# Patient Record
Sex: FEMALE | Race: BLACK OR AFRICAN AMERICAN | Employment: UNEMPLOYED | URBAN - METROPOLITAN AREA
[De-identification: names, ages, dates, MRNs, and addresses within clinical notes are randomized per-mention and may not be internally consistent; named-entity substitution may affect disease eponyms.]

---

## 2019-03-01 ENCOUNTER — OFFICE VISIT (OUTPATIENT)
Dept: ORTHOPEDIC SURGERY | Age: 34
End: 2019-03-01

## 2019-03-01 VITALS
SYSTOLIC BLOOD PRESSURE: 115 MMHG | DIASTOLIC BLOOD PRESSURE: 75 MMHG | TEMPERATURE: 97.5 F | WEIGHT: 274.4 LBS | OXYGEN SATURATION: 98 % | HEIGHT: 69 IN | RESPIRATION RATE: 18 BRPM | HEART RATE: 78 BPM | BODY MASS INDEX: 40.64 KG/M2

## 2019-03-01 DIAGNOSIS — M51.36 DDD (DEGENERATIVE DISC DISEASE), LUMBAR: ICD-10-CM

## 2019-03-01 DIAGNOSIS — M54.16 LUMBAR NEURITIS: ICD-10-CM

## 2019-03-01 DIAGNOSIS — M47.817 LUMBOSACRAL SPONDYLOSIS WITHOUT MYELOPATHY: Primary | ICD-10-CM

## 2019-03-01 PROBLEM — E66.01 OBESITY, MORBID (HCC): Status: ACTIVE | Noted: 2019-03-01

## 2019-03-01 RX ORDER — IBUPROFEN 800 MG/1
TABLET ORAL
Refills: 0 | COMMUNITY
Start: 2019-01-17

## 2019-03-01 RX ORDER — QUETIAPINE FUMARATE 100 MG/1
TABLET, FILM COATED ORAL
Refills: 0 | COMMUNITY
Start: 2019-02-25

## 2019-03-01 RX ORDER — TRAMADOL HYDROCHLORIDE 50 MG/1
TABLET ORAL
Refills: 0 | COMMUNITY
Start: 2019-01-23

## 2019-03-01 RX ORDER — GABAPENTIN 100 MG/1
100 CAPSULE ORAL 3 TIMES DAILY
Qty: 90 CAP | Refills: 1 | Status: SHIPPED | OUTPATIENT
Start: 2019-03-01

## 2019-03-01 RX ORDER — PREDNISONE 20 MG/1
TABLET ORAL
Refills: 0 | COMMUNITY
Start: 2019-01-25

## 2019-03-01 RX ORDER — ONDANSETRON 4 MG/1
TABLET, ORALLY DISINTEGRATING ORAL
Refills: 0 | COMMUNITY
Start: 2019-01-17

## 2019-03-01 RX ORDER — LIDOCAINE 50 MG/G
PATCH TOPICAL
Refills: 0 | COMMUNITY
Start: 2019-01-23

## 2019-03-01 RX ORDER — DOCUSATE SODIUM 100 MG/1
CAPSULE, LIQUID FILLED ORAL
Refills: 0 | COMMUNITY
Start: 2019-01-23

## 2019-03-01 RX ORDER — METHOCARBAMOL 500 MG/1
TABLET, FILM COATED ORAL
Refills: 0 | COMMUNITY
Start: 2019-01-31

## 2019-03-01 RX ORDER — SERTRALINE HYDROCHLORIDE 25 MG/1
TABLET, FILM COATED ORAL
Refills: 0 | COMMUNITY
Start: 2019-02-25

## 2019-03-01 RX ORDER — POLYETHYLENE GLYCOL 3350 17 G/17G
POWDER, FOR SOLUTION ORAL
Refills: 0 | COMMUNITY
Start: 2019-01-23

## 2019-03-01 NOTE — PROGRESS NOTES
1. Have you been to the ER, urgent care clinic since your last visit? Hospitalized since your last visit? Yes When: 1/16/19, 1/23/19, 1/25/19 and 1/31/19, [facility] for low back 2. Have you seen or consulted any other health care providers outside of the [address] [place] [address] since your last visit? Include any pap smears or colon screening. Yes, Multiple ER visit

## 2019-03-01 NOTE — PROGRESS NOTES
[facility] 
[address], [address] Phone: [phone] Fax: [phone] INITIAL CONSULTATION 
 
 
HISTORY OF PRESENT ILLNESS: 
Macario Butler is a 28 y.o. female whom is self-referred secondary to chronic, episodic low back pain extending into the RLE in an L5 distribution to the foot involving the great toe. She rates her pain 9/10. Her pain is not exacerbated with any particular position or motion. Pt has had spinal injections in the past x 1 year ago which helped. She denies h/o spinal surgery. She had PT about a year ago and is completing her HEP daily. Pt has treated with MDP with temporary relief and muscle relaxers with no relief. She denies possibility of pregnancy or breastfeeding. Pt denies change in bowel or bladder habits. Pt denies fever, weight loss, or skin changes. Pt has a h/o heroin use, clean for 7 months. She is Rx methadone by [facility]. Pt is followed by a psychiatrist for schizophrenia. ER note from Dr. Jeremi Wilkins dated 1/16/19 indicating patient presented for chronic low back pain without sciatica x 1 week. Worse with lying down and sitting up. H/o heroin use. H/o scoliosis. Having nausea at that time. Clean for 6 months. H/o cocaine use, clean. Treated with Ibuprofen and Tramadol. ER note from Dr. Dennise Jane dated 1/29/19 indicating patient was seen with c/o acute exacerbation chronic low back pain. Worse with standing. Indicated she was on methadone. Pt has asking for a steroid epidural at that time. Treated with Tramadol. ER note from Dr. Pino Raymundo dated 1/25/19 indicating patient presented for chronic back pain x 5 years. Right buttocks with tingling in the left foot. Pt reported no relief with Tramadol. Started on MDP at that time. ER note from Dr. Dennise Jane dated 1/31/19 indicating patient presented for chronic back pain, worse over the past several weeks.  Pt reported initial relief with MDP. Treated with Diclofenac and Robaxin. The patient is RHD.  reviewed. Body mass index is 40.52 kg/m². PCP: No primary care provider on file. History reviewed. No pertinent past medical history. History reviewed. No pertinent surgical history. Social History Tobacco Use  Smoking status: Current Every Day Smoker  Smokeless tobacco: Never Used Substance Use Topics  Alcohol use: No  
  Frequency: Never Work status: The patient is N/A. Marital status: The patient is . No Known Allergies Family History Family history unknown: Yes REVIEW OF SYSTEMS Constitutional symptoms: Negative Eyes: Negative Ears, Nose, Throat, and Mouth: Negative Cardiovascular: Negative Respiratory: Negative Genitourinary: Negative Integumentary (Skin and/or breast): Negative Musculoskeletal: Positive for low back pain, RLE pain Extremities: Negative for edema. Endocrine/Rheumatologic: Negative Hematologic/Lymphatic: Negative Allergic/Immunologic: Negative Psychiatric: Negative PHYSICAL EXAMINATION Visit Vitals /75 Pulse 78 Temp 97.5 °F (36.4 °C) (Oral) Resp 18 Ht 5' 9\" (1.753 m) Wt 274 lb 6.4 oz (124.5 kg) SpO2 98% BMI 40.52 kg/m² CONSTITUTIONAL: NAD, A&O x 3 HEART: Regular rate and rhythm ABDOMEN: Positive bowel sounds, soft, nontender, and nondistended LUNGS: Clear to auscultation bilaterally. SKIN: Negative for rash. RANGE OF MOTION: The patient has full passive range of motion in all four extremities. SENSATION: Sensation is intact to light touch throughout. MOTOR:  
Straight Leg Raise: Negative, bilateral 
[place]: Negative, bilateral 
Deep tendon reflexes are 0 at the brachioradialis, biceps, and triceps bilaterally. Deep tendon reflexes are 0 at the knees and ankles bilaterally. Shoulder AB/Flex Elbow Flex Wrist Ext Elbow Ext Wrist Flex Hand Intrin Tone Right +4/5 +4/5 +4/5 +4/5 +4/5 +4/5 +4/5 Left +4/5 +4/5 +4/5 +4/5 +4/5 +4/5 +4/5 Hip Flex Knee Ext Knee Flex Ankle DF GTE Ankle PF Tone Right +4/5 +4/5 +4/5 +4/5 +4/5 +4/5 +4/5 Left +4/5 +4/5 +4/5 +4/5 +4/5 +4/5 +4/5 ASSESSMENT Diagnoses and all orders for this visit: 1. Lumbosacral spondylosis without myelopathy 2. Lumbar neuritis 3. DDD (degenerative disc disease), lumbar IMPRESSIONS/RECOMMENDATIONS: 
Patient presents today with c/o chronic, episodic low back pain extending into the RLE in an L5 distribution to the foot involving the great toe. Patient has a h/o heroin abuse and I did explain to her that I would not be prescribing her narcotics. I will try her on Neurontin 100 mg TID. The risks, benefits, and potential side effects of this medication were discussed. Patient understands and wishes to proceed. Patient advised to call the office if intolerant to new medication. I will order CBC/ESR/CRP labs for the patient. I advised patient to bring copies of films to next visit. I have asked the patient to sign a release of information in order to obtain block notes from [facility]. Multiple treatment options were discussed. Patient is neurologically intact. I will see the patient back in 1 month's time or earlier if needed. Written by Patricia Greene, as dictated by Silke Fritz MD 
I examined the patient, reviewed and agree with the note.

## 2019-03-29 ENCOUNTER — OFFICE VISIT (OUTPATIENT)
Dept: ORTHOPEDIC SURGERY | Age: 34
End: 2019-03-29

## 2019-03-29 VITALS
HEIGHT: 69 IN | WEIGHT: 270 LBS | SYSTOLIC BLOOD PRESSURE: 118 MMHG | HEART RATE: 97 BPM | TEMPERATURE: 98.8 F | OXYGEN SATURATION: 100 % | RESPIRATION RATE: 16 BRPM | DIASTOLIC BLOOD PRESSURE: 77 MMHG | BODY MASS INDEX: 39.99 KG/M2

## 2019-03-29 DIAGNOSIS — M47.817 LUMBOSACRAL SPONDYLOSIS WITHOUT MYELOPATHY: Primary | ICD-10-CM

## 2019-03-29 DIAGNOSIS — M51.36 DDD (DEGENERATIVE DISC DISEASE), LUMBAR: ICD-10-CM

## 2019-03-29 DIAGNOSIS — M54.16 LUMBAR RADICULOPATHY: ICD-10-CM

## 2019-03-29 RX ORDER — GABAPENTIN 300 MG/1
300 CAPSULE ORAL 3 TIMES DAILY
Qty: 90 CAP | Refills: 1 | Status: SHIPPED | OUTPATIENT
Start: 2019-03-29

## 2019-03-29 RX ORDER — GABAPENTIN 600 MG/1
600 TABLET ORAL 3 TIMES DAILY
Qty: 90 TAB | Refills: 1 | Status: SHIPPED | OUTPATIENT
Start: 2019-03-29 | End: 2019-03-29 | Stop reason: CLARIF

## 2019-03-29 NOTE — PROGRESS NOTES
[facility]  [address], [address]   Phone: [phone]  Fax: [phone]        PROGRESS NOTE      HISTORY OF PRESENT ILLNESS:  The patient is a 28 y.o. female and was seen today for follow up of chronic, episodic low back pain extending into the RLE in an L5 distribution to the foot involving the great toe. Her pain is not exacerbated with any particular position or motion. Pt has had spinal injections in the past x 1 year ago which helped. She denies h/o spinal surgery. She had PT about a year ago and is completing her HEP daily. Pt has treated with MDP with temporary relief and muscle relaxers with no relief. She denies possibility of pregnancy or breastfeeding. Pt denies change in bowel or bladder habits. Pt denies fever, weight loss, or skin changes. The patient is RHD. Pt has a h/o heroin use, clean for 7 months. She was Rx meth[facility]one by [facility]. Pt is followed by psychiatrist Dr. Damon Rubio [phone]) for schizophrenia. ER note from Dr. Milan Rubin dated 1/16/19 indicating patient presented for chronic low back pain without sciatica x 1 week. Worse with lying down and sitting up. H/o heroin use. H/o scoliosis. Having nausea at that time. Clean for 6 months. H/o cocaine use, clean. Treated with Ibuprofen and Tramadol. ER note from Dr. Zena Servin dated 1/29/19 indicating patient was seen with c/o acute exacerbation chronic low back pain. Worse with standing. Indicated she was on methadone. Pt has asking for a steroid epidural at that time. Treated with Tramadol. ER note from Dr. Ricci Romano dated 1/25/19 indicating patient presented for chronic back pain x 5 years. Right buttocks with tingling in the left foot. Pt reported no relief with Tramadol. Started on MDP at that time. ER note from Dr. Zena Servin dated 1/31/19 indicating patient presented for chronic back pain, worse over the past several weeks. Pt reported initial relief with MDP. Treated with Diclofenac and Robaxin. At her last clinical appointment, patient presented with c/o chronic, episodic low back pain extending into the RLE in an L5 distribution to the foot involving the great toe. Patient has a h/o heroin abuse and I did explain to her that I would not be prescribing her narcotics. I tried her on Neurontin 100 mg TID. I ordered CBC/ESR/CRP labs for the patient. I advised patient to bring copies of films to next visit. I asked the patient to sign a release of information in order to obtain block notes from [facility]. The patient returns today with pain location and distribution remain unchanged. She continues to rate her pain 9/10. Pt did not get the CBC/ESR/CRP labs that were ordered at her last clinical appointment for reasons that are unclear to me. She is tolerating Neurontin 100 mg TID with no relief. According to the pt, she is no longer being prescribed methadone. Records from [facility] were not obtained. Lumbar spine XR dated 1/23/19 reviewed. Per report, no significant abnormality.  reviewed. Body mass index is 39.87 kg/m². PCP: No primary care provider on file. History reviewed. No pertinent past medical history.      Social History     Socioeconomic History    Marital status:      Spouse name: Not on file    Number of children: Not on file    Years of education: Not on file    Highest education level: Not on file   Occupational History    Not on file   Social Needs    Financial resource strain: Not on file    Food insecurity:     Worry: Not on file     Inability: Not on file    Transportation needs:     Medical: Not on file     Non-medical: Not on file   Tobacco Use    Smoking status: Current Every Day Smoker    Smokeless tobacco: Never Used   Substance and Sexual Activity    Alcohol use: No     Frequency: Never    Drug use: No    Sexual activity: Not on file   Lifestyle    Physical activity:     Days per week: Not on file     Minutes per session: Not on file    Stress: Not on file   Relationships    Social connections:     Talks on phone: Not on file     Gets together: Not on file     Attends Oriental orthodox service: Not on file     Active member of club or organization: Not on file     Attends meetings of clubs or organizations: Not on file     Relationship status: Not on file    Intimate partner violence:     Fear of current or ex partner: Not on file     Emotionally abused: Not on file     Physically abused: Not on file     Forced sexual activity: Not on file   Other Topics Concern     Service Not Asked    Blood Transfusions Not Asked    Caffeine Concern Not Asked    Occupational Exposure Not Asked    Hobby Hazards Not Asked    Sleep Concern Not Asked    Stress Concern Not Asked    Weight Concern Not Asked    Special Diet Not Asked    Back Care Not Asked    Exercise Not Asked    Bike Helmet Not Asked   [address] Not Asked    Self-Exams Not Asked   Social History Narrative    Not on file       Current Outpatient Medications   Medication Sig Dispense Refill    sertraline (ZOLOFT) 25 mg tablet take 1 tablet by mouth every morning  0    QUEtiapine (SEROQUEL) 100 mg tablet take 1 and 1/2 tablet by mouth at bedtime  0    traMADol (ULTRAM) 50 mg tablet take 1 tablet by mouth every 6 hours if needed for pain for UP TO 1 DAY  0    predniSONE (DELTASONE) 20 mg tablet take 3 tablets by mouth once daily for 5 days  0    polyethylene glycol (MIRALAX) 17 gram/dose powder take 17GM (DISSOLVED IN 8 OUNCES OF LIQUID) by mouth once daily  0    ondansetron (ZOFRAN ODT) 4 mg disintegrating tablet dissolve 1 tablet ON TONGUE every 8 hours if needed for nausea and vomiting  0    methocarbamol (ROBAXIN) 500 mg tablet take 1 tablet by mouth four times a day  0    lidocaine (LIDODERM) 5 % apply 1 patch to the affected area daily. Leave on for 12 hours and then off for 12 hours.   0    ibuprofen (MOTRIN) 800 mg tablet take 1 tablet by mouth every 6 hours if needed  0    COL-RITE 100 mg capsule take 1 capsule by mouth twice a day for 14 days  0    gabapentin (NEURONTIN) 100 mg capsule Take 1 Cap by mouth three (3) times daily. 90 Cap 1       No Known Allergies       PHYSICAL EXAMINATION    Visit Vitals  /77 (BP 1 Location: Left arm, BP Patient Position: Sitting)   Pulse 97   Temp 98.8 °F (37.1 °C) (Oral)   Resp 16   Ht 5' 9\" (1.753 m)   Wt 270 lb (122.5 kg)   SpO2 100%   BMI 39.87 kg/m²       CONSTITUTIONAL: NAD, A&O x 3  SENSATION: Intact to light touch throughout  RANGE OF MOTION: The patient has full passive range of motion in all four extremities. MOTOR:  Straight Leg Raise: Negative, bilateral               Hip Flex Knee Ext Knee Flex Ankle DF GTE Ankle PF Tone   Right +4/5 +4/5 +4/5 +4/5 +4/5 +4/5 +4/5   Left +4/5 +4/5 +4/5 +4/5 +4/5 +4/5 +4/5       ASSESSMENT   Diagnoses and all orders for this visit:    1. Lumbosacral spondylosis without myelopathy    2. Lumbar radiculopathy    3. DDD (degenerative disc disease), lumbar          IMPRESSION AND PLAN:  I will increase her dose of Neurontin to 300 mg TID. Patient advised to call the office if intolerant to higher dose. I will reorder CBC/ESR/CRP labs for the patient. I will once again attempt to obtain patient's records from [facility]. Patient is neurologically intact. I will see the patient back in 1 month's time or earlier if needed. Written by Siri Lo, as dictated by Diamante Burt MD  I examined the patient, reviewed and agree with the note.

## 2019-04-01 DIAGNOSIS — M47.817 LUMBOSACRAL SPONDYLOSIS WITHOUT MYELOPATHY: ICD-10-CM

## 2019-04-01 DIAGNOSIS — M51.36 DDD (DEGENERATIVE DISC DISEASE), LUMBAR: ICD-10-CM

## 2019-04-01 DIAGNOSIS — M54.16 LUMBAR NEURITIS: ICD-10-CM

## 2020-01-14 ENCOUNTER — OFFICE VISIT (OUTPATIENT)
Dept: OBGYN CLINIC | Age: 35
End: 2020-01-14

## 2020-01-14 ENCOUNTER — HOSPITAL ENCOUNTER (OUTPATIENT)
Dept: LAB | Age: 35
Discharge: HOME | End: 2020-01-14
Payer: MEDICAID

## 2020-01-14 DIAGNOSIS — F45.8 PSEUDOCYESIS: ICD-10-CM

## 2020-01-14 DIAGNOSIS — N92.6 MISSED MENSES: ICD-10-CM

## 2020-01-14 DIAGNOSIS — F45.8 PSEUDOCYESIS: Primary | ICD-10-CM

## 2020-01-14 LAB — HCG SERPL-ACNC: <1 MIU/ML (ref 0–10)

## 2020-01-14 PROCEDURE — 84702 CHORIONIC GONADOTROPIN TEST: CPT

## 2020-01-14 NOTE — PROGRESS NOTES
Previous chart closed in error    Ms. Pedro Luis Koch presents reports an LMP in September. She reports being a patient at the methadone clinic and states that she informed them about abdominal pain, spotting, and nausea. She states that she had a +pregnancy test there. Today, her UPT is negative. A bedside U/S shows a normal appearing uterus with an endometrial stripe of 6mm. Increased concern for pseudocyesis. Will obtain beta HCG for further confirmation. Orders Placed This Encounter    AMB POC US OB < 14 WKS, 1ST GESTATION     Order Specific Question:   Reason for Exam     Answer:   missed menses     Order Specific Question:   Is Patient Allergic to Contrast Dye? Answer:   No     Order Specific Question:   Is Patient Pregnant?      Answer:   Unknown    BETA HCG, QT     Standing Status:   Future     Standing Expiration Date:   1/14/2021

## 2020-01-17 ENCOUNTER — TELEPHONE (OUTPATIENT)
Dept: OBGYN CLINIC | Age: 35
End: 2020-01-17

## 2020-01-17 NOTE — TELEPHONE ENCOUNTER
----- Message from Tram Mcarthur MD sent at 1/15/2020 10:37 AM EST -----  HCG negative. Please advise. Thank you.

## 2020-01-20 ENCOUNTER — TELEPHONE (OUTPATIENT)
Dept: OBGYN CLINIC | Age: 35
End: 2020-01-20

## 2020-01-20 NOTE — TELEPHONE ENCOUNTER
----- Message from Della Bowling MD sent at 1/15/2020 10:37 AM EST -----  HCG negative. Please advise. Thank you.

## 2020-01-20 NOTE — TELEPHONE ENCOUNTER
I have attempted to contact this patient by phone with the following results: no answer unable to leave message .

## 2020-02-05 ENCOUNTER — HOSPITAL ENCOUNTER (EMERGENCY)
Age: 35
Discharge: HOME | End: 2020-02-05
Attending: EMERGENCY MEDICINE
Payer: MEDICAID

## 2020-02-05 VITALS
SYSTOLIC BLOOD PRESSURE: 115 MMHG | WEIGHT: 288 LBS | OXYGEN SATURATION: 97 % | DIASTOLIC BLOOD PRESSURE: 78 MMHG | BODY MASS INDEX: 42.65 KG/M2 | HEIGHT: 69 IN | RESPIRATION RATE: 20 BRPM | TEMPERATURE: 97 F | HEART RATE: 83 BPM

## 2020-02-05 DIAGNOSIS — M25.562 CHRONIC PAIN OF BOTH KNEES: Primary | ICD-10-CM

## 2020-02-05 DIAGNOSIS — M25.561 CHRONIC PAIN OF BOTH KNEES: Primary | ICD-10-CM

## 2020-02-05 DIAGNOSIS — G89.29 CHRONIC PAIN OF BOTH KNEES: Primary | ICD-10-CM

## 2020-02-05 DIAGNOSIS — J06.9 UPPER RESPIRATORY TRACT INFECTION, UNSPECIFIED TYPE: ICD-10-CM

## 2020-02-05 PROCEDURE — 99282 EMERGENCY DEPT VISIT SF MDM: CPT

## 2020-02-05 RX ORDER — PSEUDOEPHEDRINE HYDROCHLORIDE 60 MG/1
60 TABLET ORAL
Qty: 20 TAB | Refills: 1 | Status: SHIPPED | OUTPATIENT
Start: 2020-02-05 | End: 2020-02-10

## 2020-02-05 RX ORDER — FLUTICASONE PROPIONATE 50 MCG
2 SPRAY, SUSPENSION (ML) NASAL DAILY
Qty: 1 BOTTLE | Refills: 0 | Status: SHIPPED | OUTPATIENT
Start: 2020-02-05

## 2020-02-05 NOTE — DISCHARGE INSTRUCTIONS
Patient Education        Knee Pain or Injury: Care Instructions  Your Care Instructions    Injuries are a common cause of knee problems. Sudden (acute) injuries may be caused by a direct blow to the knee. They can also be caused by abnormal twisting, bending, or falling on the knee. Pain, bruising, or swelling may be severe, and may start within minutes of the injury. Overuse is another cause of knee pain. Other causes are climbing stairs, kneeling, and other activities that use the knee. Everyday wear and tear, especially as you get older, also can cause knee pain. Rest, along with home treatment, often relieves pain and allows your knee to heal. If you have a serious knee injury, you may need tests and treatment. Follow-up care is a key part of your treatment and safety. Be sure to make and go to all appointments, and call your doctor if you are having problems. It's also a good idea to know your test results and keep a list of the medicines you take. How can you care for yourself at home? · Be safe with medicines. Read and follow all instructions on the label. ? If the doctor gave you a prescription medicine for pain, take it as prescribed. ? If you are not taking a prescription pain medicine, ask your doctor if you can take an over-the-counter medicine. · Rest and protect your knee. Take a break from any activity that may cause pain. · Put ice or a cold pack on your knee for 10 to 20 minutes at a time. Put a thin cloth between the ice and your skin. · Prop up a sore knee on a pillow when you ice it or anytime you sit or lie down for the next 3 days. Try to keep it above the level of your heart. This will help reduce swelling. · If your knee is not swollen, you can put moist heat, a heating pad, or a warm cloth on your knee. · If your doctor recommends an elastic bandage, sleeve, or other type of support for your knee, wear it as directed.   · Follow your doctor's instructions about how much weight you can put on your leg. Use a cane, crutches, or a walker as instructed. · Follow your doctor's instructions about activity during your healing process. If you can do mild exercise, slowly increase your activity. · Reach and stay at a healthy weight. Extra weight can strain the joints, especially the knees and hips, and make the pain worse. Losing even a few pounds may help. When should you call for help? Call 911 anytime you think you may need emergency care. For example, call if:    · You have symptoms of a blood clot in your lung (called a pulmonary embolism). These may include:  ? Sudden chest pain. ? Trouble breathing. ? Coughing up blood.    Call your doctor now or seek immediate medical care if:    · You have severe or increasing pain.     · Your leg or foot turns cold or changes color.     · You cannot stand or put weight on your knee.     · Your knee looks twisted or bent out of shape.     · You cannot move your knee.     · You have signs of infection, such as:  ? Increased pain, swelling, warmth, or redness. ? Red streaks leading from the knee. ? Pus draining from a place on your knee. ? A fever.     · You have signs of a blood clot in your leg (called a deep vein thrombosis), such as:  ? Pain in your calf, back of the knee, thigh, or groin. ? Redness and swelling in your leg or groin.    Watch closely for changes in your health, and be sure to contact your doctor if:    · You have tingling, weakness, or numbness in your knee.     · You have any new symptoms, such as swelling.     · You have bruises from a knee injury that last longer than 2 weeks.     · You do not get better as expected. Where can you learn more? Go to http://karma-vik.info/. Enter K195 in the search box to learn more about \"Knee Pain or Injury: Care Instructions. \"  Current as of: June 26, 2019  Content Version: 12.2  © 1128-4893 [employer].  Care instructions adapted under license by [facility] (which disclaims liability or warranty for this information). If you have questions about a medical condition or this instruction, always ask your healthcare professional. [place] [address] any warranty or liability for your use of this information. Patient Education        Upper Respiratory Infection (Cold): Care Instructions  Your Care Instructions    An upper respiratory infection, or URI, is an infection of the nose, sinuses, or throat. URIs are spread by coughs, sneezes, and direct contact. The common cold is the most frequent kind of URI. The flu and sinus infections are other kinds of URIs. Almost all URIs are caused by viruses. Antibiotics won't cure them. But you can treat most infections with home care. This may include drinking lots of fluids and taking over-the-counter pain medicine. You will probably feel better in 4 to 10 days. The doctor has checked you carefully, but problems can develop later. If you notice any problems or new symptoms, get medical treatment right away. Follow-up care is a key part of your treatment and safety. Be sure to make and go to all appointments, and call your doctor if you are having problems. It's also a good idea to know your test results and keep a list of the medicines you take. How can you care for yourself at home? · To prevent dehydration, drink plenty of fluids, enough so that your urine is light yellow or clear like water. Choose water and other caffeine-free clear liquids until you feel better. If you have kidney, heart, or liver disease and have to limit fluids, talk with your doctor before you increase the amount of fluids you drink. · Take an over-the-counter pain medicine, such as acetaminophen (Tylenol), ibuprofen (Advil, Motrin), or naproxen (Aleve). Read and follow all instructions on the label. · Before you use cough and cold medicines, check the label.  These medicines may not be safe for young children or for people with certain health problems. · Be careful when taking over-the-counter cold or flu medicines and Tylenol at the same time. Many of these medicines have acetaminophen, which is Tylenol. Read the labels to make sure that you are not taking more than the recommended dose. Too much acetaminophen (Tylenol) can be harmful. · Get plenty of rest.  · Do not smoke or allow others to smoke around you. If you need help quitting, talk to your doctor about stop-smoking programs and medicines. These can increase your chances of quitting for good. When should you call for help? Call 911 anytime you think you may need emergency care. For example, call if:    · You have severe trouble breathing.    Call your doctor now or seek immediate medical care if:    · You seem to be getting much sicker.     · You have new or worse trouble breathing.     · You have a new or higher fever.     · You have a new rash.    Watch closely for changes in your health, and be sure to contact your doctor if:    · You have a new symptom, such as a sore throat, an earache, or sinus pain.     · You cough more deeply or more often, especially if you notice more mucus or a change in the color of your mucus.     · You do not get better as expected. Where can you learn more? Go to http://karma-vik.info/. Enter G802 in the search box to learn more about \"Upper Respiratory Infection (Cold): Care Instructions. \"  Current as of: June 9, 2019  Content Version: 12.2  © 3316-4643 1.618 [employer]. Care instructions adapted under license by [employer] (which disclaims liability or warranty for this information). If you have questions about a medical condition or this instruction, always ask your healthcare professional. [place] [address] any warranty or liability for your use of this information.

## 2020-02-05 NOTE — ED PROVIDER NOTES
[employer]  [facility] EMERGENCY DEPT      Date: 2/5/2020  Patient Name: Sergio Mendoza    History of Presenting Illness     Chief Complaint   Patient presents with    Nasal Congestion    Back Pain     lower  back    Knee Pain     R knee pain       27 y.o. female presents to the ED c/o nasal congestion for the past 2 days. She notes also having chronic aching in her bilateral knees for the past month. Patient states she has a diagnosis of arthritis and notes that she has fluid in her knees. She does not recall any injury. She has not taken anything for her current symptoms. Denies any numbness, weakness, redness, warmth, fever, chills, or other symptoms at this time. Patient denies any other associated signs or symptoms. Patient denies any other complaints. Nursing notes regarding the HPI and triage nursing notes were reviewed. Prior medical records were reviewed. Current Outpatient Medications   Medication Sig Dispense Refill    fluticasone propionate (FLONASE) 50 mcg/actuation nasal spray 2 Sprays by Both Nostrils route daily. 1 Bottle 0    sodium chloride (NASAL MIST) 0.9 % spra 1 Spray by Nasal route as needed (congestion). 126 mL 0    pseudoephedrine (SUDAFED) 60 mg tablet Take 1 Tab by mouth every six (6) hours as needed for Congestion for up to 5 days. 20 Tab 1    gabapentin (NEURONTIN) 300 mg capsule Take 1 Cap by mouth three (3) times daily.  90 Cap 1    sertraline (ZOLOFT) 25 mg tablet take 1 tablet by mouth every morning  0    QUEtiapine (SEROQUEL) 100 mg tablet take 1 and 1/2 tablet by mouth at bedtime  0    traMADol (ULTRAM) 50 mg tablet take 1 tablet by mouth every 6 hours if needed for pain for UP TO 1 DAY  0    predniSONE (DELTASONE) 20 mg tablet take 3 tablets by mouth once daily for 5 days  0    polyethylene glycol (MIRALAX) 17 gram/dose powder take 17GM (DISSOLVED IN 8 OUNCES OF LIQUID) by mouth once daily  0    ondansetron (ZOFRAN ODT) 4 mg disintegrating tablet dissolve 1 tablet ON TONGUE every 8 hours if needed for nausea and vomiting  0    methocarbamol (ROBAXIN) 500 mg tablet take 1 tablet by mouth four times a day  0    lidocaine (LIDODERM) 5 % apply 1 patch to the affected area daily. Leave on for 12 hours and then off for 12 hours. 0    ibuprofen (MOTRIN) 800 mg tablet take 1 tablet by mouth every 6 hours if needed  0    COL-RITE 100 mg capsule take 1 capsule by mouth twice a day for 14 days  0    gabapentin (NEURONTIN) 100 mg capsule Take 1 Cap by mouth three (3) times daily. 80 Cap 1       Past History     Past Medical History:  History reviewed. No pertinent past medical history. Past Surgical History:  History reviewed. No pertinent surgical history. Family History:  Family History   Problem Relation Age of Onset    No Known Problems Mother     No Known Problems Father        Social History:  Social History     Tobacco Use    Smoking status: Current Every Day Smoker    Smokeless tobacco: Current User   Substance Use Topics    Alcohol use: No     Frequency: Never    Drug use: No       Allergies:  No Known Allergies    Patient's primary care provider (as noted in EPIC):  None    Review of Systems   Constitutional:  Denies malaise, fever, chills. ENMT:  + nasal congestion. Denies sore throat. Neck:  Denies injury or pain. Respiratory:  Denies cough, wheezing, difficulty breathing, shortness of breath. Extremity/MS: + bilateral knee pain. Neuro:  Denies neurologic symptoms/deficits/paresthesias. Skin: Denies injury, rash, itching or skin changes. All other systems negative as reviewed. Visit Vitals  /78 (BP 1 Location: Left arm, BP Patient Position: At rest;Sitting)   Pulse 83   Temp 97 °F (36.1 °C)   Resp 20   Ht 5' 9\" (1.753 m)   Wt 130.6 kg (288 lb)   SpO2 97%   BMI 42.53 kg/m²       PHYSICAL EXAM:    CONSTITUTIONAL:  Alert, in no apparent distress; morbidly obese. HEAD:  Normocephalic, atraumatic. EYES:  EOMI. Non-icteric sclera. Normal conjunctiva. ENTM:  Nose: Nasal turbinates erythematous and edematous, mild clear rhinorrhea. Throat:  no erythema or exudate, mucous membranes moist.  Uvula midline, airway patent. NECK:  Supple  RESPIRATORY:  Chest clear, equal breath sounds, good air movement. Without wheezes, rhonchi or rales. CARDIOVASCULAR:  Regular rate and rhythm. No murmurs, rubs, or gallops. UPPER EXT:  Normal inspection. LOWER EXT: Bilateral knees with mild edema present, without warmth or erythema, no tenderness to palpation, MVI distally with 5-5 strength. NEURO:  Moves all four extremities, and grossly normal motor exam.  SKIN:  No rashes;  Normal for age. PSYCH:  Alert and normal affect. DIFFERENTIAL DIAGNOSES/ MEDICAL DECISION MAKING:  URI, allergic rhinitis, arthritis, DJD, other etiologies. IMPRESSION AND MEDICAL DECISION MAKING:  Based upon the patient's presentation with noted HPI and PE, along with the work up done in the emergency department, I believe that the patient is having an upper respiratory infection, yet no signs of bronchitis nor pneumonia. Will write for Flonase, nasal mist, Sudafed. Patient is questioning if she needs antibiotics, I have informed her this is likely viral and should go away on its own with time, rest, drinking lots of water. Patient also having chronic knee pain bilaterally x1 month. She states she has a history of arthritis and thinks she has fluid on her knees. She did not have any injury, will not x-ray. She is morbidly obese, may have degenerative joint disease and thus chronic knee pain. Plan for follow-up with orthopedics. She was counseled on taking ibuprofen/Tylenol at home for her pain and RICEing.     Diagnosis:   1. Chronic pain of both knees    2.  Upper respiratory tract infection, unspecified type      Disposition: Discharge    Follow-up Information     Follow up With Specialties Details [facility] [address] [facility] - [place] In 3 days  [address] [facility], [address] [facility] [address]    Vicente Velasquez MD Internal Medicine In 3 days  [address] [place] [address]  [address] [address]  [phone]      [facility] EMERGENCY DEPT Emergency Medicine  If symptoms worsen [address] [id number]  [phone]          Discharge Medication List as of 2/5/2020 11:17 AM      START taking these medications    Details   fluticasone propionate (FLONASE) 50 mcg/actuation nasal spray 2 Sprays by Both Nostrils route daily. , Print, Disp-1 Bottle, R-0      sodium chloride (NASAL MIST) 0.9 % spra 1 Spray by Nasal route as needed (congestion). , Print, Disp-126 mL, R-0      pseudoephedrine (SUDAFED) 60 mg tablet Take 1 Tab by mouth every six (6) hours as needed for Congestion for up to 5 days. , Print, Disp-20 Tab, R-1         CONTINUE these medications which have NOT CHANGED    Details   !! gabapentin (NEURONTIN) 300 mg capsule Take 1 Cap by mouth three (3) times daily. , Normal, Disp-90 Cap, R-1      sertraline (ZOLOFT) 25 mg tablet take 1 tablet by mouth every morning, Historical Med, R-0      QUEtiapine (SEROQUEL) 100 mg tablet take 1 and 1/2 tablet by mouth at bedtime, Historical Med, R-0      traMADol (ULTRAM) 50 mg tablet take 1 tablet by mouth every 6 hours if needed for pain for UP TO 1 DAY, Historical Med, R-0      predniSONE (DELTASONE) 20 mg tablet take 3 tablets by mouth once daily for 5 days, Historical Med, R-0      polyethylene glycol (MIRALAX) 17 gram/dose powder take 17GM (DISSOLVED IN 8 OUNCES OF LIQUID) by mouth once daily, Historical Med, R-0      ondansetron (ZOFRAN ODT) 4 mg disintegrating tablet dissolve 1 tablet ON TONGUE every 8 hours if needed for nausea and vomiting, Historical Med, R-0      methocarbamol (ROBAXIN) 500 mg tablet take 1 tablet by mouth four times a day, Historical Med, R-0      lidocaine (LIDODERM) 5 % apply 1 patch to the affected area daily. Leave on for 12 hours and then off for 12 hours. , Historical Med, R-0      ibuprofen (MOTRIN) 800 mg tablet take 1 tablet by mouth every 6 hours if needed, Historical Med, R-0      COL-RITE 100 mg capsule take 1 capsule by mouth twice a day for 14 days, Historical Med, R-0, MARIELA      !! gabapentin (NEURONTIN) 100 mg capsule Take 1 Cap by mouth three (3) times daily. , Normal, Disp-90 Cap, R-1       !! - Potential duplicate medications found. Please discuss with provider.         SHUBHAM Cowan

## 2021-01-20 ENCOUNTER — TRANSCRIBE ORDER (OUTPATIENT)
Dept: SCHEDULING | Age: 36
End: 2021-01-20

## 2021-01-20 DIAGNOSIS — M17.11 OSTEOARTHRITIS OF RIGHT KNEE: Primary | ICD-10-CM

## 2021-05-17 ENCOUNTER — HOSPITAL ENCOUNTER (OUTPATIENT)
Dept: PHYSICAL THERAPY | Age: 36
Discharge: HOME | End: 2021-05-17
Payer: MEDICAID

## 2021-05-17 PROCEDURE — 97162 PT EVAL MOD COMPLEX 30 MIN: CPT | Performed by: PHYSICAL THERAPIST

## 2021-05-17 NOTE — PROGRESS NOTES
PT DAILY TREATMENT NOTE/KNEE EVAL     Patient Name: Anoop Number  Date:2021  : 1985  [x]  Patient  Verified  Payor: [facility] MEDICAID / Plan: [facility] [address] [facility] Product Type: Managed Care Medicaid /    In time:11:59  Out time:12:30  Total Treatment Time (min): 31  Visit #: 1 of 8    Treatment Area: Pain in right knee [M25.561]  Left knee pain [M25.562]    SUBJECTIVE  Pain Level (0-10 scale): 10/10 now; 8/10 at best; 10/10 at worst.  [x]constant []intermittent []improving []worsening []no change since onset    Any medication changes, allergies to medications, adverse drug reactions, diagnosis change, or new procedure performed?: [x] No    [] Yes (see summary sheet for update)  Subjective functional status/changes:     PLOF: Independent, no limitations prior to two months ago. Limitations to PLOF: Limited activity due to knee pain. Unable to do \"outside things\". Mechanism of Injury: Sudden onset of bilateral knee pain two months ago. She denies any injury or trauma. States she has arthritis in her knees. She also c/o back pain. Current symptoms/Complaints: Constant pain in both knees, right > left. She notes she cannot walk much, unable to do outdoor activities. She does not have steps at home so she does not have to do stairs. She states she is unable to kneel or squat. Previous Treatment/Compliance: No PT. Did have injections. Two weeks ago. Helped \"somewhat\". PMHx/Surgical Hx: No h/o surgery on her knees. Work Hx: Not working  Living Situation: single level living. Pt Goals: \"So I can feel better and make the pain better somewhat\". Barriers: [x]pain []financial []time []transportation []other  Cognition: A & O x 3    Other:    OBJECTIVE/EXAMINATION  Domestic Life: Lives with her fiance. Activity/Recreational Limitations: Limited activity. Mobility: ambulates without any AD. Self Care: Needs assistance with housekeeping, cooking.       20 min [x]Silver []Re-Eval       11 min Therapeutic Exercise:  [] See flow sheet : Emphasis on increasing LE AROM and strength. Rationale: increase ROM and increase strength to improve the patients ability to increase tolerance to activity. With   [] TE   [] TA   [] neuro   [] other: Patient Education: [x] Review HEP    [] Progressed/Changed HEP based on:   [] positioning   [] body mechanics   [] transfers   [] heat/ice application    [] other:      Other Objective/Functional Measures:     Physical Therapy Evaluation - Knee    Posture: [] Varus    [x] Valgus    [] Recurvatum        [] Tibial Torsion    [] Foot Supination    [] Foot Pronation    Describe:    Gait:  [x] Normal    [] Abnormal    [] Antalgic    [] NWB    Device: None    Describe: normal gait pattern with decreased meng. Increased lateral excursion noted.       ROM / Strength  [] Unable to assess                  AROM                                        Strength (1-5)    Left Right Left Right Left Right   Hip Flexion [facility] WFL                  Abduction WFL                  Knee Flexion 113 118   4 4    Extension 0 0   4 4                           Flexibility: [] Unable to assess at this time  Hamstrings:    (L) Tightness= [] WNL   [] Min   [x] Mod   [] Severe    (R) Tightness= [] WNL   [] Min   [x] Mod   [] Severe  Quadriceps:    (L) Tightness= [] WNL   [] Min   [x] Mod   [] Severe    (R) Tightness= [] WNL   [] Min   [x] Mod   [] Severe    Palpation:  Left Knee:   Neg/Pos  Neg/Pos  Neg/Pos   Joint Line(Med/Lat) (+) MCL Quad tendon \"a little\" Patellar tendon (+)   Patella (-) Gastrocnemius (Med/Lat) (-) Pes Anserinus (+)   Popliteal Fossa \"a little\" Hamstring tendons (Med/Lat) (+) lateral MCL/LCL MCL (+)     Right Knee   Neg/Pos  Neg/Pos  Neg/Pos   Joint Line(Med/Lat) (+)/(+) Quad tendon (+) Patellar tendon (+)   Patella (-) Gastrocnemius (Med/Lat) (+) Pes Anserinus (+)   Popliteal Fossa (+) Hamstring tendons (Med/Lat) (+) B MCL/LCL (+)/(+) Optional Tests:  Unable to assess due to poor motor activation of quads muscles. Patellar Positioning (Static)   []L []R Normal []L []R Lateral   []L []R Savilla Contes      []L []R Medial   []L []R [facility]    Patellar Tracking   []L []R Glide (Lat)   []L []R Tilt (Lat)     []L []R Glide (Med)  []L []R Tilt (Med)      []L []R Tile (Inf)     Patellar Mobility   []L []R Hypermobile [x]L [x]R Hypomobile         Special Tests. Lachmans  [x] Neg    [] Pos bilaterally    Valgus@ 0 Degrees [x] Neg    [] Pos bilaterally    Valgus@ 30 Degrees [x] Neg    [] Pos bilaterally    Varus@ 0 Degrees [x] Neg    [] Pos bilaterally    Varus@ 30 Degrees [] Neg    [x] Pos laxity bilaterally   Patellar Compression [x] Neg    [] Pos bilaterally      Pain Level (0-10 scale) post treatment: 7.5    ASSESSMENT/Changes in Function: Patient with signs and symptoms consistent with bilateral knee pain. She notes sudden onset about two months ago. Reports she has arthritis and has had injections which gave \"some\" relief. She also notes she is \"capable of falling\" and does report dizziness. She notes she has difficulty with housekeeping, cooking and other chores. She does not have to do steps as she lives in a single level home. She notes she needs assistance with self care ADLs. Patient will continue to benefit from skilled PT services to modify and progress therapeutic interventions, address functional mobility deficits, address strength deficits, analyze and address soft tissue restrictions, analyze and cue movement patterns, address imbalance/dizziness and instruct in home and community integration to attain remaining goals. [x]  See Plan of Care  []  See progress note/recertification  []  See Discharge Summary         Progress towards goals / Updated goals:  Short Term Goals: To be accomplished in 1 weeks:  1.   Patient will become proficient in their HEP and will be compliant in performing that program.  Evaluation:   Patient given a written/illustrated HEP.     Long Term Goals: To be accomplished in 4 weeks:  1. Patient's pain level will be 5-6/10 with activity in order to improve patient's ability to perform normal ADLs. Evaluation:  8/10-10/10  2. Patient will demonstrate 5/5 strength on MMT quads and hamstrings both knees to increase ease of ADLs. Evaluation:  Quads 4/5; Hamstrings 4/5  3. Patient will increase FOTO score to 47 to indicate increased functional mobility. Evaluation:  14  4. Patient will report she has mild to moderate difficulty getting into and out of the bath in order to improve her self care ADLs. Evaluation:  Notes this is extremely difficult or she is unable to perform.     PLAN  [x]  Upgrade activities as tolerated     [x]  Continue plan of care  []  Update interventions per flow sheet       []  Discharge due to:_  []  Other:_      Francia Stevens, PT 5/17/2021  10:10 AM

## 2021-05-17 NOTE — PROGRESS NOTES
[facility]              [address] [facility]        [place], [address] [place]   [phone] fax    Plan of Care/ Statement of Necessity for Physical Therapy Services  Patient name: Vero Carreno Start of Care: 2021   Referral source: Elaine Blankenship MD : 1985    Medical Diagnosis: Pain in right knee [M25.561]  Left knee pain [M25.562]  Payor: [facility] MEDICAID / [id number] / Product Type: Managed Care Medicaid /  Onset Date:3/15/2021    Treatment Diagnosis: Right knee pain; Left knee pain   Prior Hospitalization: see medical history Provider#: [id number]   Medications: Verified on Patient summary List    Comorbidities: Allergies, Anxiety/Panic Attacks, Arthritis, Back Pain, BMI 45.0, Depression, Headaches. Prior Level of Function: Independent, no limitations prior to two months ago. The Plan of Care and following information is based on the information from the initial evaluation. Assessment/ key information: Patient with signs and symptoms consistent with bilateral knee pain. She notes sudden onset about two months ago. Reports she has arthritis and has had injections which gave \"some\" relief. She also notes she is \"capable of falling\" and does report dizziness. She notes she has difficulty with housekeeping, cooking and other chores. She does not have to do steps as she lives in a single level home. She notes she needs assistance with self care ADLs. Patient will benefit from a program of skilled physical therapy to include therapeutic exercises to address strength deficits, therapeutic activities to improve functional mobility, neuromuscular reeducation to address balance, coordination and proprioception, manual therapy to address ROM and tissue extensibility and modalities as indicated. All questions were answered.     Evaluation Complexity History HIGH Complexity :3+ comorbidities / personal factors will impact the outcome/ POC ; Examination MEDIUM Complexity : 3 Standardized tests and measures addressing body structure, function, activity limitation and / or participation in recreation  ;Presentation MEDIUM Complexity : Evolving with changing characteristics  ; Clinical Decision Making HIGH Complexity : FOTO score of 1- 25   Overall Complexity Rating: MEDIUM  Problem List: pain affecting function, decrease strength, decrease ADL/ functional abilitiies, decrease activity tolerance and decrease flexibility/ joint mobility   Treatment Plan may include any combination of the following: Therapeutic exercise, Therapeutic activities, Neuromuscular re-education, Physical agent/modality, Gait/balance training and Manual therapy  Patient / Family readiness to learn indicated by: asking questions, trying to perform skills and interest  Persons(s) to be included in education: patient (P)  Barriers to Learning/Limitations: None  Patient Goal (s): So I can feel better and make the pain better somewhat  Patient Self Reported Health Status: fair  Rehabilitation Potential: fair    Short Term Goals: To be accomplished in 1 weeks:  1. Patient will become proficient in their HEP and will be compliant in performing that program.  Evaluation:   Patient given a written/illustrated HEP. Long Term Goals: To be accomplished in 4 weeks:  1. Patient's pain level will be 5-6/10 with activity in order to improve patient's ability to perform normal ADLs. Evaluation:  8/10-10/10  2. Patient will demonstrate 5/5 strength on MMT quads and hamstrings both knees to increase ease of ADLs. Evaluation:  Quads 4/5; Hamstrings 4/5  3. Patient will increase FOTO score to 47 to indicate increased functional mobility. Evaluation:  14  4. Patient will report she has mild to moderate difficulty getting into and out of the bath in order to improve her self care ADLs.   Evaluation:  Notes this is extremely difficult or she is unable to perform. Frequency / Duration: Patient to be seen 2 times per week for 4 weeks. Patient/ Caregiver education and instruction: Diagnosis, prognosis, exercises   [x]  Plan of care has been reviewed with APOLINAR Bonds, PT 5/17/2021 10:08 AM  ________________________________________________________________________    I certify that the above Therapy Services are being furnished while the patient is under my care. I agree with the treatment plan and certify that this therapy is necessary.     [de-identified] Signature:____________Date:_________TIME:________     Othel Moritz, MD  ** Signature, Date and Time must be completed for valid certification **  Please sign and return to [facility] [address], [address] [place]   [phone] fax

## 2021-05-20 ENCOUNTER — APPOINTMENT (OUTPATIENT)
Dept: PHYSICAL THERAPY | Age: 36
End: 2021-05-20
Payer: MEDICAID

## 2021-05-26 ENCOUNTER — HOSPITAL ENCOUNTER (OUTPATIENT)
Dept: PHYSICAL THERAPY | Age: 36
Discharge: HOME | End: 2021-05-26
Payer: MEDICAID

## 2021-05-26 PROCEDURE — 97530 THERAPEUTIC ACTIVITIES: CPT

## 2021-05-26 PROCEDURE — 97112 NEUROMUSCULAR REEDUCATION: CPT

## 2021-05-26 PROCEDURE — 97110 THERAPEUTIC EXERCISES: CPT

## 2021-05-26 NOTE — PROGRESS NOTES
PT DAILY TREATMENT NOTE     Patient Name: Mary Anne Cuenca  Date:2021  : 1985  [x]  Patient  Verified  Payor: [facility] MEDICAID / Plan: [facility] [address] [facility] / Product Type: Managed Care Medicaid /    In time:7:19  Out time:8;16  Total Treatment Time (min): 62  Visit #: 2 of 8    Treatment Area: Pain in right knee [M25.561]  Left knee pain [M25.562]    SUBJECTIVE  Pain Level (0-10 scale): right knee 10;left knee 8  Any medication changes, allergies to medications, adverse drug reactions, diagnosis change, or new procedure performed?: [x] No    [] Yes (see summary sheet for update)  Subjective functional status/changes:   [] No changes reported  Patient reports she is in constant pain at all times. Patient reports she continues to feel relief from injections in her knees, but reports having pain along the top of her left foot, which she previously had injections for. OBJECTIVE    Modality rationale: decrease pain to improve the patients ability to decrease difficulty while performing tasks. Min Type Additional Details   10 []  Ice     [x]  heat  []  Ice massage  []  Laser   []  Anodyne Position:sitting  Location:B knee   [] Skin assessment post-treatment:  []intact []redness- no adverse reaction    []redness  adverse reaction:       27 min Therapeutic Exercise:  [x] See flow sheet :   Rationale: increase ROM and increase strength to improve the patients ability to increase ease with ADLs. 10 min Therapeutic Activity:  [x]  See flow sheet :empahsis on transfers, bed mobility and ease with ambulation. Rationale: increase ROM and increase strength  to improve the patients ability to increase ease with prolong ambulation. 10 min Neuromuscular Re-education:  [x]  See flow sheet :empahsis on quad activation.     Rationale: increase ROM, increase strength, improve coordination, improve balance and increase proprioception  to improve the patients ability to increase ease with transitional movement. With   [] TE   [] TA   [] neuro   [] other: Patient Education: [x] Review HEP    [] Progressed/Changed HEP based on:   [] positioning   [] body mechanics   [] transfers   [] heat/ice application    [] other:      Other Objective/Functional Measures: Patient reports performing HEP. Pain Level (0-10 scale) post treatment: right knee 6; left knee 4    ASSESSMENT/Changes in Function: Initiated exercises per POC. Patient reported a painful \"poppping\" with LAQ, encouraged patient to not go in full extension where the popping was occurring. Patient has limited mobility with supine bridges. Patient will continue to benefit from skilled PT services to modify and progress therapeutic interventions, address functional mobility deficits, address ROM deficits, address strength deficits and analyze and address soft tissue restrictions to attain remaining goals. []  See Plan of Care  []  See progress note/recertification  []  See Discharge Summary         Progress towards goals / Updated goals:  Short Term Goals: To be accomplished in 1 weeks:  1.  Patient will become proficient in their HEP and will be compliant in performing that program.  Evaluation:   Patient given a written/illustrated HEP. Current: MET: Patient reports performing HEP. 5/26/21     Long Term Goals: To be accomplished in 4 weeks:  1. Patient's pain level will be 5-6/10 with activity in order to improve patient's ability to perform normal ADLs. Evaluation:  8/10-10/10  2. Patient will demonstrate 5/5 strength on MMT quads and hamstrings both knees to increase ease of ADLs. Evaluation:  Quads 4/5; Hamstrings 4/5  3. Patient will increase FOTO score to 47 to indicate increased functional mobility. Evaluation:  14  4. Patient will report she has mild to moderate difficulty getting into and out of the bath in order to improve her self care ADLs.   Evaluation:  Notes this is extremely difficult or she is unable to perform.       PLAN  []  Upgrade activities as tolerated     [x]  Continue plan of care  []  Update interventions per flow sheet       []  Discharge due to:_  []  Other:_      Leidy Morton PTA 5/26/2021  7:24 AM    Future Appointments   Date Time Provider Jason Gambino   5/27/2021  7:15 AM Richard Siddiqui PTA [facility] [id number]

## 2021-05-27 ENCOUNTER — APPOINTMENT (OUTPATIENT)
Dept: PHYSICAL THERAPY | Age: 36
End: 2021-05-27
Payer: MEDICAID

## 2021-05-28 ENCOUNTER — HOSPITAL ENCOUNTER (OUTPATIENT)
Dept: PHYSICAL THERAPY | Age: 36
Discharge: HOME | End: 2021-05-28
Payer: MEDICAID

## 2021-05-28 PROCEDURE — 97110 THERAPEUTIC EXERCISES: CPT

## 2021-05-28 PROCEDURE — 97530 THERAPEUTIC ACTIVITIES: CPT

## 2021-05-28 PROCEDURE — 97112 NEUROMUSCULAR REEDUCATION: CPT

## 2021-05-28 NOTE — PROGRESS NOTES
PT DAILY TREATMENT NOTE     Patient Name: Tressa Turner  Date:2021  : 1985  [x]  Patient  Verified  Payor: Bianca Martin / Plan: [facility] [address] [facility] CCCP / Product Type: Managed Care Medicaid /    In time:7:10  Out time:7:48  Total Treatment Time (min): 38  Visit #: 3 of 8    Treatment Area: Pain in right knee [M25.561]  Left knee pain [M25.562]    SUBJECTIVE  Pain Level (0-10 scale): B knee 8  Any medication changes, allergies to medications, adverse drug reactions, diagnosis change, or new procedure performed?: [x] No    [] Yes (see summary sheet for update)  Subjective functional status/changes:   [] No changes reported  Patient reports she was sore after last therapy session. OBJECTIVE          18 min Therapeutic Exercise:  [x]? See flow sheet :   Rationale: increase ROM and increase strength to improve the patients ability to increase ease with ADLs.    10 min Therapeutic Activity:  [x]? See flow sheet :empahsis on transfers, bed mobility and ease with ambulation. Rationale: increase ROM and increase strength  to improve the patients ability to increase ease with prolong ambulation. 10 min Neuromuscular Re-education:  [x]? See flow sheet :empahsis on quad activation. Rationale: increase ROM, increase strength, improve coordination, improve balance and increase proprioception  to improve the patients ability to increase ease with transitional movement.            With   [] TE   [] TA   [] neuro   [] other: Patient Education: [x] Review HEP    [] Progressed/Changed HEP based on:   [] positioning   [] body mechanics   [] transfers   [] heat/ice application    [] other:      Other Objective/Functional Measures: supine bridge = 50% AROM     Pain Level (0-10 scale) post treatment: 6.5    ASSESSMENT/Changes in Function:Held on initiation of new exercises due to soreness after last session, patient was able to tolerate increase in reps.      Patient will continue to benefit from skilled PT services to modify and progress therapeutic interventions, address functional mobility deficits, address ROM deficits, address strength deficits and analyze and address soft tissue restrictions to attain remaining goals. []  See Plan of Care  []  See progress note/recertification  []  See Discharge Summary         Progress towards goals / Updated goals:  Short Term Goals: To be accomplished in 1 weeks:  1.  Patient will become proficient in their HEP and will be compliant in performing that program.  Evaluation:   Patient given a written/illustrated HEP. Current: MET: Patient reports performing HEP. 5/26/21     Long Term Goals: To be accomplished in 4 weeks:  1. Patient's pain level will be 5-6/10 with activity in order to improve patient's ability to perform normal ADLs. Evaluation:  8/10-10/10  2. Patient will demonstrate 5/5 strength on MMT quads and hamstrings both knees to increase ease of ADLs. Evaluation:  Quads 4/5; Hamstrings 4/5  3. Patient will increase FOTO score to 47 to indicate increased functional mobility. Evaluation:  14  4. Patient will report she has mild to moderate difficulty getting into and out of the bath in order to improve her self care ADLs.   Evaluation:  Notes this is extremely difficult or she is unable to perform.       PLAN  []  Upgrade activities as tolerated     [x]  Continue plan of care  []  Update interventions per flow sheet       []  Discharge due to:_  []  Other:_      Bala Valdivia PTA 5/28/2021  7:16 AM    Future Appointments   Date Time Provider Jason Gambino   6/1/2021  2:45 PM Maggie Hand, PT [facility]   6/4/2021  2:00 PM Maggie Hand PT [facility]   6/7/2021  2:45 PM Sukhdeep Padron, PTA [facility]   6/10/2021  2:00 PM Sukhdeep Padron, PTA [facility]

## 2021-06-01 ENCOUNTER — HOSPITAL ENCOUNTER (OUTPATIENT)
Dept: PHYSICAL THERAPY | Age: 36
Discharge: HOME | End: 2021-06-01
Payer: MEDICAID

## 2021-06-01 PROCEDURE — 97112 NEUROMUSCULAR REEDUCATION: CPT | Performed by: PHYSICAL THERAPIST

## 2021-06-01 PROCEDURE — 97530 THERAPEUTIC ACTIVITIES: CPT | Performed by: PHYSICAL THERAPIST

## 2021-06-01 PROCEDURE — 97110 THERAPEUTIC EXERCISES: CPT | Performed by: PHYSICAL THERAPIST

## 2021-06-01 NOTE — PROGRESS NOTES
PT DAILY TREATMENT NOTE     Patient Name: Lola Sutton  Date:2021  : 1985  [x]  Patient  Verified  Payor: [employer] [place] / Plan: [facility] [address] [facility] CCCP / Product Type: Managed Care Medicaid /    In time:3:04  Out time:3:53  Total Treatment Time (min): 52  Visit #: 4 of 8    Treatment Area: Pain in right knee [M25.561]  Left knee pain [M25.562]    SUBJECTIVE  Pain Level (0-10 scale): 10  Any medication changes, allergies to medications, adverse drug reactions, diagnosis change, or new procedure performed?: [x] No    [] Yes (see summary sheet for update)  Subjective functional status/changes:   [] No changes reported  Patient reports she continues to have bilateral knee pain and pain also in her foot and she has scoliosis so she has pain in \"all 3 of those places\". She notes she has improved and feels the periods that she has no pain is lasting longer. OBJECTIVE    Modality rationale: decrease pain to improve the patients ability to increase her tolerance to activity.    Min Type Additional Details    [] Estim:  []Unatt       []IFC  []Premod                        []Other:  []w/ice   []w/heat  Position:  Location:    [] Estim: []Att    []TENS instruct  []NMES                    []Other:  []w/US   []w/ice   []w/heat  Position:  Location:    []  Traction: [] Cervical       []Lumbar                       [] Prone          []Supine                       []Intermittent   []Continuous Lbs:  [] before manual  [] after manual    []  Ultrasound: []Continuous   [] Pulsed                           []1MHz   []3MHz W/cm2:  Location:    []  Iontophoresis with dexamethasone         Location: [] Take home patch   [] In clinic   10 []  Ice     [x]  heat  []  Ice massage  []  Laser   []  Anodyne Position: supine  Location:both knees    []  Laser with stim  []  Other:  Position:  Location:    []  Vasopneumatic Device  Pre-treatment girth:  Post-treatment girth:  Measured at (location):  Pressure: [] lo [] med [] hi   Temperature: [] lo [] med [] hi   [] Skin assessment post-treatment:  []intact []redness- no adverse reaction    []redness  adverse reaction:     19 min Therapeutic Exercise:  [] See flow sheet :Emphasis on increasing AROM and LE strength. Rationale: increase ROM and increase strength to improve the patients ability to increase her functional activity level. 10 min Therapeutic Activity:  []  See flow sheet :Emphasis on transfers into and out of her tub, and in and out of her car. Rationale: increase strength and improve coordination  to improve the patients ability to increase ease with transfers and mobility. 10 min Neuromuscular Re-education:  []  See flow sheet : Emphasis on quad and gluteal muscle activation in order to increase LE proprioception and kinesthetic awareness. Rationale: increase strength, improve coordination and increase proprioception  to improve the patients ability to increase ease with ambulation and stairs. With   [] TE   [] TA   [] neuro   [] other: Patient Education: [x] Review HEP    [] Progressed/Changed HEP based on:   [] positioning   [] body mechanics   [] transfers   [] heat/ice application    [] other:      Other Objective/Functional Measures: Able to observe patient exit her car, she did not exhibit any difficulty with that action. She ambulates without any assistive device and she did not have any altered gait pattern despite report of 10/10 pain. Pain Level (0-10 scale) post treatment: 8.5    ASSESSMENT/Changes in Function: Patient continues to report high levels of pain however, objectively she did not have any gait abnormalities entering therapy today, nor did she have any problems with transferring out of and into her car.     Patient will continue to benefit from skilled PT services to modify and progress therapeutic interventions, address functional mobility deficits, address ROM deficits, address strength deficits and analyze and address soft tissue restrictions to attain remaining goals. [x]  See Plan of Care  []  See progress note/recertification  []  See Discharge Summary         Progress towards goals / Updated goals:  Short Term Goals: To be accomplished in 1 weeks:  1.  Patient will become proficient in their HEP and will be compliant in performing that program.  Evaluation:   Patient given a written/illustrated HEP. Current: MET: Patient reports performing HEP. 5/26/21     Long Term Goals: To be accomplished in 4 weeks:  1. Patient's pain level will be 5-6/10 with activity in order to improve patient's ability to perform normal ADLs. Evaluation:  8/10-10/10  Current:  6/10-10/10.  6/1/2021. Progressing. 2. Patient will demonstrate 5/5 strength on MMT quads and hamstrings both knees to increase ease of ADLs. Evaluation:  Quads 4/5; Hamstrings 4/5  Current:  Quads 5/5; HS 5/5.  6/1/2021 Goal Met.  3. Patient will increase FOTO score to 47 to indicate increased functional mobility. Evaluation:  14  Current:  FOTO 23.  6/1/2021. Progressing. 4. Patient will report she has mild to moderate difficulty getting into and out of the bath in order to improve her self care ADLs. Evaluation:  Notes this is extremely difficult or she is unable to perform. Current:  Patient reports extreme difficulty. 6/1/2021. No change.     PLAN  [x]  Upgrade activities as tolerated     [x]  Continue plan of care  []  Update interventions per flow sheet       []  Discharge due to:_  []  Other:_      Cass Angelo, PT 6/1/2021  3:06 PM    Future Appointments   Date Time Provider Jason Gambino   6/4/2021  2:00 PM Vangie Diaz, PT [facility]   6/7/2021  2:45 PM Mireille Head, PTA [facility]   6/10/2021  2:00 PM Mireille Push, PTA [facility]

## 2021-06-01 NOTE — PROGRESS NOTES
[facility]              [address]        [place], [address] [place]   [phone] fax    Physician Update  [x] Progress Note  [] Discharge Summary  Patient name: Kristan Berg Start of Care: 2021   Referral source: Maria T Paniagua MD : 1985   Medical/Treatment Diagnosis: Pain in right knee [M25.561]  Left knee pain [M25.562]  Payor: [facility] MEDICAID / Plan: [id number] [place] [address] CCCP / Product Type: Managed Care Medicaid /  Onset Date:3/15/2021     Prior Hospitalization: see medical history Provider#: [id number]   Medications: Verified on Patient Summary List    Comorbidities: Allergies, Anxiety/Panic Attacks, Arthritis, Back Pain, BMI 45.0, Depression, Headaches. Prior Level of Function: Independent, no limitations prior to two months ago. Visits from Start of Care: 4    Missed Visits: 2    Status at Evaluation/Last Progress Note:   Short Term Goals: To be accomplished in 1 weeks:  1.  Patient will become proficient in their HEP and will be compliant in performing that program.  Evaluation:   Patient given a written/illustrated HEP.     Long Term Goals: To be accomplished in 4 weeks:  1. Patient's pain level will be 5-6/10 with activity in order to improve patient's ability to perform normal ADLs. Evaluation:  8/10-10/10  2. Patient will demonstrate 5/5 strength on MMT quads and hamstrings both knees to increase ease of ADLs. Evaluation:  Quads 4/5; Hamstrings 4/5  3. Patient will increase FOTO score to 47 to indicate increased functional mobility. Evaluation:  14  4. Patient will report she has mild to moderate difficulty getting into and out of the bath in order to improve her self care ADLs.   Evaluation:  Notes this is extremely difficult or she is unable to perform.     Progress towards Goals:   Short Term Goals: To be accomplished in 1 weeks:  1.  Patient will become proficient in their HEP and will be compliant in performing that program.  Evaluation:   Patient given a written/illustrated HEP. Current: MET: Patient reports performing HEP. 5/26/21     Long Term Goals: To be accomplished in 4 weeks:  1. Patient's pain level will be 5-6/10 with activity in order to improve patient's ability to perform normal ADLs. Evaluation:  8/10-10/10  Current:  6/10-10/10.  6/1/2021. Progressing. 2. Patient will demonstrate 5/5 strength on MMT quads and hamstrings both knees to increase ease of ADLs. Evaluation:  Quads 4/5; Hamstrings 4/5  Current:  Quads 5/5; HS 5/5.  6/1/2021 Goal Met.  3. Patient will increase FOTO score to 47 to indicate increased functional mobility. Evaluation:  14  Current:  FOTO 23.  6/1/2021. Progressing. 4. Patient will report she has mild to moderate difficulty getting into and out of the bath in order to improve her self care ADLs. Evaluation:  Notes this is extremely difficult or she is unable to perform. Current:  Patient reports extreme difficulty. 6/1/2021. No change.     Goals: to be achieved in 2 weeks:  Continue to work toward unmet goals above. ASSESSMENT/RECOMMENDATIONS:  Patient is making slow progress toward goals. Her subjective c/o pain continue to be severe despite lack of objective findings. Patient observed exiting and entering her car without apparent discomfort or difficulty. Her gait is without deviation and she is walking without any AD despite claim of 10/10 pain level.     [x]Continue therapy per initial plan/protocol at a frequency of  2 x per week for 2 weeks  []Continue therapy with the following recommended changes:_____________________      _____________________________________________________________________  []Discontinue therapy progressing towards or have reached established goals  []Discontinue therapy due to lack of appreciable progress towards goals  []Discontinue therapy due to lack of attendance or compliance  []Await Physician's recommendations/decisions regarding therapy  []Other:________________________________________________________________    Thank you for this referral.    Toñito Pena, PT 6/1/2021 5:27 PM    NOTE TO PHYSICIAN:  PLEASE COMPLETE THE ORDERS BELOW AND   FAX TO [facility]: [phone]  If you are unable to process this request in 24 hours please contact our office: [phone]    []  I have read the above report and request that my patient continue as recommended. []  I have read the above report and request that my patient continue therapy with the following changes/special instructions:________________________________________  []I have read the above report and request that my patient be discharged from therapy.     Physician's Signature:____________Date:_________TIME:________     Rene Zhao MD  ** Signature, Date and Time must be completed for valid certification **

## 2021-06-04 ENCOUNTER — APPOINTMENT (OUTPATIENT)
Dept: PHYSICAL THERAPY | Age: 36
End: 2021-06-04
Payer: MEDICAID

## 2021-06-08 ENCOUNTER — APPOINTMENT (OUTPATIENT)
Dept: PHYSICAL THERAPY | Age: 36
End: 2021-06-08
Payer: MEDICAID

## 2021-06-08 NOTE — PROGRESS NOTES
[facility]              [address]        [place], [address] [place]   [phone] fax    Discharge Summary  Patient name: Seema Marmolejo Start of Care: 2021   Referral source: Lia Sanchez MD : 1985   Medical/Treatment Diagnosis: Pain in right knee [M25.561]  Left knee pain [M25.562]  Payor: [facility] MEDICAID / Plan: [address] [place] [address] CCCP / Product Type: Managed Care Medicaid /  Onset Date:3/15/2021     Prior Hospitalization: see medical history Provider#: [id number]   Medications: Verified on Patient Summary List    Comorbidities: Allergies, Anxiety/Panic Attacks, Arthritis, Back Pain, BMI 45.0, Depression, Headaches. Prior Level of Function: Independent, no limitations prior to two months ago. Visits from Start of Care: 4    Missed Visits: 3  Reporting Period : 2021 to 2021    Summary of Care:  Short Term Goals: To be accomplished in 1 weeks:  1.  Patient will become proficient in their HEP and will be compliant in performing that program.  Evaluation:   Patient given a written/illustrated HEP. Current: MET: Patient reports performing HEP. 21     Long Term Goals: To be accomplished in 4 weeks:  1. Patient's pain level will be 5-6/10 with activity in order to improve patient's ability to perform normal ADLs. Evaluation:  8/10-10/10  Current:  6/10-10/10.  2021.  Progressing. 2. Patient will demonstrate 5/5 strength on MMT quads and hamstrings both knees to increase ease of ADLs. Evaluation:  Quads 4/5; Hamstrings 4/5  Current:  Quads 5/5; HS 5/5.  2021 Goal Met.  3. Patient will increase FOTO score to 47 to indicate increased functional mobility. Evaluation:  14  Current:  FOTO 23.  2021.  Progressing. 4. Patient will report she has mild to moderate difficulty getting into and out of the bath in order to improve her self care ADLs.   Evaluation:  Notes this is extremely difficult or she is unable to perform. Current:  Patient reports extreme difficulty.  6/1/2021.  No change.       ASSESSMENT/RECOMMENDATIONS:  [x]Discontinue therapy: []Patient has reached or is progressing toward set goals      [x]Patient is non-compliant, she has failed to show for 3 of her scheduled visits and is discharged per our established policy. Patient was made aware of this policy prior to her discharge. []Due to lack of appreciable progress towards set goals    Libra Buck, PT 6/8/2021 8:12 AM    NOTE TO PHYSICIAN:  Please complete the following and fax to: [facility] at [phone]  . Retain this original for your records. If you are unable to process this request in   24 hours, please contact our office.      [] I have read the above report and request that my patient continue therapy with the following changes/special instructions:  [] I have read the above report and request that my patient be discharged from therapy    Physician's Signature:____________Date:_________TIME:________     Padilla Blankenship MD  ** Signature, Date and Time must be completed for valid certification **

## 2021-06-10 ENCOUNTER — APPOINTMENT (OUTPATIENT)
Dept: PHYSICAL THERAPY | Age: 36
End: 2021-06-10
Payer: MEDICAID

## 2021-11-08 ENCOUNTER — OFFICE VISIT (OUTPATIENT)
Dept: ORTHOPEDIC SURGERY | Age: 36
End: 2021-11-08
Payer: MEDICAID

## 2021-11-08 VITALS — HEIGHT: 69 IN | OXYGEN SATURATION: 99 % | WEIGHT: 293 LBS | BODY MASS INDEX: 43.4 KG/M2 | HEART RATE: 81 BPM

## 2021-11-08 DIAGNOSIS — M17.0 PRIMARY OSTEOARTHRITIS OF BOTH KNEES: Primary | ICD-10-CM

## 2021-11-08 PROCEDURE — 20611 DRAIN/INJ JOINT/BURSA W/US: CPT | Performed by: ORTHOPAEDIC SURGERY

## 2021-11-08 PROCEDURE — 99203 OFFICE O/P NEW LOW 30 MIN: CPT | Performed by: ORTHOPAEDIC SURGERY

## 2021-11-08 RX ORDER — TRIAMCINOLONE ACETONIDE 40 MG/ML
80 INJECTION, SUSPENSION INTRA-ARTICULAR; INTRAMUSCULAR ONCE
Status: COMPLETED | OUTPATIENT
Start: 2021-11-08 | End: 2021-11-08

## 2021-11-08 RX ADMIN — TRIAMCINOLONE ACETONIDE 80 MG: 40 INJECTION, SUSPENSION INTRA-ARTICULAR; INTRAMUSCULAR at 15:37

## 2021-11-08 NOTE — PROGRESS NOTES
Cristofer Coleman  1985   Chief Complaint   Patient presents with    Knee Pain     both knees R>L         HISTORY OF PRESENT ILLNESS  Cristofer Coleman is a 39 y.o. female who presents today for evaluation of b/l knee pain. She rates her pain 10/10 today. Pain has been present for years. She notes hx of arthritis in her knees and foot. Has had prior cortisone injections most recently around 6 months ago which provided relief for 60-90 days. Patient describes the pain as aching and stabbing that is Constant in nature. Symptoms are worse with bending, stretching, walking, Activity and is better with  Rest. Associated symptoms include nothing. Since problem started, it: is unchanged. Pain does not wake patient up at night. Has taken no meds for the problem. Has tried following treatments: Injections:YES; Brace:NO; Therapy:NO; Cane/Crutch:NO       No Known Allergies     History reviewed. No pertinent past medical history.    Social History     Socioeconomic History    Marital status:      Spouse name: Not on file    Number of children: Not on file    Years of education: Not on file    Highest education level: Not on file   Occupational History    Not on file   Tobacco Use    Smoking status: Current Every Day Smoker    Smokeless tobacco: Current User   Substance and Sexual Activity    Alcohol use: No    Drug use: No    Sexual activity: Yes     Partners: Male   Other Topics Concern     Service Not Asked    Blood Transfusions Not Asked    Caffeine Concern Not Asked    Occupational Exposure Not Asked    Hobby Hazards Not Asked    Sleep Concern Not Asked    Stress Concern Not Asked    Weight Concern Not Asked    Special Diet Not Asked    Back Care Not Asked    Exercise Not Asked    Bike Helmet Not Asked   [address] Not Asked    Self-Exams Not Asked   Social History Narrative    Not on file     Social Determinants of Health     Financial Resource Strain:     Difficulty of Paying Living Expenses: Not on file   Food Insecurity:     Worried About [address] in the Last Year: Not on file    Ran Out of Food in the Last Year: Not on file   Transportation Needs:     Lack of Transportation (Medical): Not on file    Lack of Transportation (Non-Medical): Not on file   Physical Activity:     Days of Exercise per Week: Not on file    Minutes of Exercise per Session: Not on file   Stress:     Feeling of Stress : Not on file   Social Connections:     Frequency of Communication with Friends and Family: Not on file    Frequency of Social Gatherings with Friends and Family: Not on file    Attends Roman [employer] Services: Not on file    Active Member of [address] [place] [address] or Organizations: Not on file    Attends Club or Organization Meetings: Not on file    Marital Status: Not on file   Intimate Partner Violence:     Fear of Current or Ex-Partner: Not on file    Emotionally Abused: Not on file    Physically Abused: Not on file    Sexually Abused: Not on file   Housing Stability:     Unable to Pay for Housing in the Last Year: Not on file    Number of [place] in the Last Year: Not on file    Unstable Housing in the Last Year: Not on file      History reviewed. No pertinent surgical history. Family History   Problem Relation Age of Onset    No Known Problems Mother     No Known Problems Father       Current Outpatient Medications   Medication Sig    fluticasone propionate (FLONASE) 50 mcg/actuation nasal spray 2 Sprays by Both Nostrils route daily.  sodium chloride (NASAL MIST) 0.9 % spra 1 Spray by Nasal route as needed (congestion).  gabapentin (NEURONTIN) 300 mg capsule Take 1 Cap by mouth three (3) times daily.     sertraline (ZOLOFT) 25 mg tablet take 1 tablet by mouth every morning    QUEtiapine (SEROQUEL) 100 mg tablet take 1 and 1/2 tablet by mouth at bedtime    traMADol (ULTRAM) 50 mg tablet take 1 tablet by mouth every 6 hours if needed for pain for UP TO 1 DAY  predniSONE (DELTASONE) 20 mg tablet take 3 tablets by mouth once daily for 5 days    polyethylene glycol (MIRALAX) 17 gram/dose powder take 17GM (DISSOLVED IN 8 OUNCES OF LIQUID) by mouth once daily    ondansetron (ZOFRAN ODT) 4 mg disintegrating tablet dissolve 1 tablet ON TONGUE every 8 hours if needed for nausea and vomiting    methocarbamol (ROBAXIN) 500 mg tablet take 1 tablet by mouth four times a day    lidocaine (LIDODERM) 5 % apply 1 patch to the affected area daily. Leave on for 12 hours and then off for 12 hours.  ibuprofen (MOTRIN) 800 mg tablet take 1 tablet by mouth every 6 hours if needed    COL-RITE 100 mg capsule take 1 capsule by mouth twice a day for 14 days    gabapentin (NEURONTIN) 100 mg capsule Take 1 Cap by mouth three (3) times daily. No current facility-administered medications for this visit. REVIEW OF SYSTEM   Patient denies: Weight loss, Fever/Chills, HA, Visual changes, Fatigue, Chest pain, SOB, Abdominal pain, N/V/D/C, Blood in stool or urine, Edema. Pertinent positive as above in HPI. All others were negative    PHYSICAL EXAM:   Visit Vitals  Pulse 81   Ht 5' 9\" (1.753 m)   Wt 294 lb (133.4 kg)   SpO2 99%   BMI 43.42 kg/m²     The patient is a well-developed, well-nourished female   in no acute distress. The patient is alert and oriented times three. The patient is alert and oriented times three. Mood and affect are normal.  LYMPHATIC: lymph nodes are not enlarged and are within normal limits  SKIN: normal in color and non tender to palpation. There are no bruises or abrasions noted. NEUROLOGICAL: Motor sensory exam is within normal limits. Reflexes are equal bilaterally.  There is normal sensation to pinprick and light touch  MUSCULOSKELETAL:  Examination Left knee Right knee   Skin Intact Intact   Range of motion 0-130 0-130   Effusion + +   Medial joint line tenderness + +   Lateral joint line tenderness + +   Tenderness Pes Bursa - -   Tenderness insertion MCL - -   Tenderness insertion LCL - -   Bhumis - -   Patella crepitus + +   Patella grind - -   Lachman - -   Pivot shift - -   Anterior drawer - -   Posterior drawer - -   Varus stress - -   Valgus stress - -   Neurovascular Intact Intact   Calf Swelling and Tenderness to Palpation - -   Radha's Test - -   Hamstring Cord Tightness - -       PROCEDURE:   Bilateral Knee Injection with Ultrasound Guidance    Indication:Bilateral Knee pain/swelling    After sterile prep, 4 cc of Xylocaine and 1 cc of Kenalog were injected into the bilateral  Knee. Intra-articular Ultrasound images captured using [employer] Loop Ultrasound machine using a frequency of 10 MHz with a linear transducer and scanned into patient's chart.            [facility] - [facility]  OFFICE PROCEDURE PROGRESS NOTE        Chart reviewed for the following:  Jass Maharaj M.D, have reviewed the History, Physical and updated the Allergic reactions for [address] [place] [address] performed immediately prior to start of procedure:  Jass Maharaj M.D, have performed the following reviews on [address] [place] [address] prior to the start of the procedure:            * Patient was identified by name and date of birth   * Agreement on procedure being performed was verified  * Risks and Benefits explained to the patient  * Procedure site verified and marked as necessary  * Patient was positioned for comfort  * Needle placement confirmed by ultrasound  * Consent was signed and verified     Time: 2:57 PM     Date of procedure: 11/8/2021    Procedure performed by:  Derek Sanches M.D    Provider assisted by: (see medication administration)    How tolerated by patient: tolerated the procedure well with no complications    Comments: none      IMAGING: XR of b/l knees with 3 views from [facility] dated 4/27/2021 was reviewed and read by Dr. Monique Josue:   IMPRESSION:  Mild bilateral tricompartmental osteoarthritis with questionable small bilateral joint effusions. IMPRESSION:      ICD-10-CM ICD-9-CM    1. Primary osteoarthritis of both knees  M17.0 715.16         PLAN:  1. Pt presents today with b/l knee pain due to primary OA and I would like to try injecting both knees with cortisone. Will also be proceeding with Euflexxa auth for both knees. I advised the patient on the importance of dramatic weight loss. Risk factors include: BMI>40  2. No ultrasound exam indicated today  3. Yes cortisone injection indicated today B/L KNEES US  4. No Physical/Occupational Therapy indicated today  5. No diagnostic test indicated today:   6. No durable medical equipment indicated today  7. No referral indicated today   8. No medications indicated today:   9. No Narcotic indicated today       RTC following Euflexx auth      Scribed by Abner Pablo Later) as dictated by Carlos Martinez MD    I, Dr. Carlos Martinez, confirm that all documentation is accurate.     Carlos Martinez M.D.   [facility]

## 2022-02-14 ENCOUNTER — OFFICE VISIT (OUTPATIENT)
Dept: ORTHOPEDIC SURGERY | Age: 37
End: 2022-02-14
Payer: MEDICAID

## 2022-02-14 VITALS — OXYGEN SATURATION: 98 % | HEIGHT: 69 IN | BODY MASS INDEX: 43.4 KG/M2 | WEIGHT: 293 LBS | HEART RATE: 87 BPM

## 2022-02-14 DIAGNOSIS — M17.0 PRIMARY OSTEOARTHRITIS OF BOTH KNEES: Primary | ICD-10-CM

## 2022-02-14 PROCEDURE — 20611 DRAIN/INJ JOINT/BURSA W/US: CPT | Performed by: ORTHOPAEDIC SURGERY

## 2022-02-14 RX ORDER — CELECOXIB 200 MG/1
200 CAPSULE ORAL DAILY
Qty: 30 CAPSULE | Refills: 1 | Status: SHIPPED | OUTPATIENT
Start: 2022-02-14

## 2022-02-14 RX ORDER — TRIAMCINOLONE ACETONIDE 40 MG/ML
80 INJECTION, SUSPENSION INTRA-ARTICULAR; INTRAMUSCULAR ONCE
Status: COMPLETED | OUTPATIENT
Start: 2022-02-14 | End: 2022-02-14

## 2022-02-14 RX ORDER — TRAMADOL HYDROCHLORIDE 50 MG/1
50 TABLET ORAL
Qty: 21 TABLET | Refills: 0 | Status: SHIPPED | OUTPATIENT
Start: 2022-02-14 | End: 2022-02-21

## 2022-02-14 RX ADMIN — TRIAMCINOLONE ACETONIDE 80 MG: 40 INJECTION, SUSPENSION INTRA-ARTICULAR; INTRAMUSCULAR at 17:08

## 2022-02-14 NOTE — PROGRESS NOTES
Patient: Larry Waddell                MRN: [id number]       SSN: [id number]  YOB: 1985        AGE: 39 y.o. SEX: female  Body mass index is 43.42 kg/m². PCP: Natalia Favre, DO  02/14/22    Chief Complaint   Patient presents with    Knee Pain     both knees R>L       HISTORY:  Larry Waddell is a 39 y.o. female who is seen for reevaluation of Bilateral knee and here for 1st injection of Euflexxa. PROCEDURE:  Under ultrasound guidance, patient's Bilateral knee, after timeout under sterile conditions, was injected with 2 cc of Euflexxa. Intra-articular. Ultrasound images captured using [employer] Loop Ultrasound machine using a frequency of 10 MHz with a linear transducer and scanned into patient's chart. [facility]  OFFICE PROCEDURE PROGRESS NOTE        Chart reviewed for the following:   Smith Sherman MD, have reviewed the History, Physical and updated the Allergic reactions for [address] performed immediately prior to start of procedure:   Smith Sherman MD, have performed the following reviews on Larry Waddell prior to the start of the procedure:            * Patient was identified by name and date of birth   * Agreement on procedure being performed was verified  * Risks and Benefits explained to the patient  * Procedure site verified and marked as necessary  * Patient was positioned for comfort  * Needle placement confirmed by ultrasound  * Consent was signed and verified     Time: 4:32 PM       Date of procedure: 2/14/2022    Procedure performed by:  Carmencita Wilkes MD    Provider assisted by: None     How tolerated by patient: tolerated the procedure well with no complications    Comments: none    IMPRESSION:     ICD-10-CM ICD-9-CM    1.  Primary osteoarthritis of both knees  M17.0 715.16         PLAN:  Ms. Sandie Causey will return in one week for her second Euflexxa injection. Scribed by Edward Grimm ([address] [place] [address] [address]) as dictated by Don Ching MD    I, Dr. Don Ching, confirm that all documentation is accurate.     Don Ching M.D.   [facility]

## 2022-02-14 NOTE — PROGRESS NOTES
Bert Sinha  1985   Chief Complaint   Patient presents with    Knee Pain     both knees R>L        HISTORY OF PRESENT ILLNESS  Bert Sinha is a 39 y.o. female who presents today for evaluation of b/l knee pain. She rates her pain 10/10 today. At last OV on 11/8/2021, patient had a b/l knee cortisone injection which provided about one week of relief. Pain has been present for years. She notes hx of arthritis in her knees and foot. Pt has swelling today. Patient denies any fever, chills, chest pain, shortness of breath or calf pain. The remainder of the review of systems is negative. There are no new illness or injuries to report since last seen in the office. There are no changes to medications, allergies, family or social history. PHYSICAL EXAM:   Visit Vitals  Pulse 87   Ht 5' 9\" (1.753 m)   Wt 294 lb (133.4 kg)   SpO2 98%   BMI 43.42 kg/m²     The patient is a well-developed, well-nourished female   in no acute distress. The patient is alert and oriented times three. The patient is alert and oriented times three. Mood and affect are normal.  LYMPHATIC: lymph nodes are not enlarged and are within normal limits  SKIN: normal in color and non tender to palpation. There are no bruises or abrasions noted. NEUROLOGICAL: Motor sensory exam is within normal limits. Reflexes are equal bilaterally.  There is normal sensation to pinprick and light touch  MUSCULOSKELETAL:  Examination Left knee Right knee   Skin Intact Intact   Range of motion 0-130 0-130   Effusion + +   Medial joint line tenderness + +   Lateral joint line tenderness + +   Tenderness Pes Bursa - -   Tenderness insertion MCL - -   Tenderness insertion LCL - -   Bhumis - -   Patella crepitus + +   Patella grind - -   Lachman - -   Pivot shift - -   Anterior drawer - -   Posterior drawer - -   Varus stress - -   Valgus stress - -   Neurovascular Intact Intact   Calf Swelling and Tenderness to Palpation - -   Radha's Test - - Hamstring Cord Tightness - -        PROCEDURE:     Bilateral knee Aspiration and Injection with Ultrasound Guidance    Indication:Bilateral Knee pain/swelling    After sterile prep, left knee was aspirated. 110 cc of clear fluid were obtained. The fluid was discarded. After sterile prep, right knee was aspirated. 130 cc of clear fluid were obtained. The fluid was discarded. After sterile prep, 4 cc of Xylocaine and 1 cc of Kenalog were injected into the bilateral  Knee. Intra-articular Ultrasound images captured using [address] [place] [address] Loop Ultrasound machine with a frequency of 10 MHz with a linear transducer and scanned into patient's chart. [facility]  OFFICE PROCEDURE PROGRESS NOTE        Chart reviewed for the following:  Oj Phillip M.D, have reviewed the History, Physical and updated the Allergic reactions for [address] [place] [address] performed immediately prior to start of procedure:  Oj Phillip M.D, have performed the following reviews on [address] [place] [address] prior to the start of the procedure:            * Patient was identified by name and date of birth   * Agreement on procedure being performed was verified  * Risks and Benefits explained to the patient  * Procedure site verified and marked as necessary  * Patient was positioned for comfort  * Needle placement confirmed by ultrasound  * Consent was signed and verified     Time: 4:46 PM     Date of procedure: 2/14/2022    Procedure performed by:  Zhen Mcintyer M.D    Provider assisted by: (see medication administration)    How tolerated by patient: tolerated the procedure well with no complications    Comments: none        IMAGING: XR of b/l knees with 3 views from [facility] dated 4/27/2021 was reviewed and read by Dr. Ruy Mchugh:   IMPRESSION:  Mild bilateral tricompartmental osteoarthritis with questionable small bilateral joint effusions.        IMPRESSION:      ICD-10-CM ICD-9-CM    1. Primary osteoarthritis of both knees  M17.0 715.16 ARTHROCENTESIS ASPIR&/INJ MAJOR JT/BURSA W/US      traMADoL (ULTRAM) 50 mg tablet      celecoxib (CeleBREX) 200 mg capsule      triamcinolone acetonide (KENALOG-40) 40 mg/mL injection 80 mg      DISCONTINUED: sodium hyaluronate (SUPARTZ FX/EUFLEXXA/HYALGAN) 10 mg/mL injection syrg 40 mg        PLAN:  1. Pt presents today with b/l knee pain due to degenerative arthritis with joint space narrowing. due to her swelling today we will proceed with an aspiration/injection. Prescribed Tramadol and celebrex to help with pain and inflammation. Return in 3 weeks. Discussed proceeding with euflexxa if the pain remains. Risk factors include: BMI>40  2. No ultrasound exam indicated today  3. Yes cortisone injection indicated today B/L KNEES US  4. No Physical/Occupational Therapy indicated today  5. No diagnostic test indicated today:   6. No durable medical equipment indicated today  7. No referral indicated today   8. No medications indicated today:   9. No Narcotic indicated today       RTC 3 weeks    Scribed by Kirby Pan) as dictated by Caridad Taylor MD    I, Dr. Caridad Taylor, confirm that all documentation is accurate.     Caridad Taylor M.D.   [facility]

## 2022-02-24 ENCOUNTER — OFFICE VISIT (OUTPATIENT)
Dept: ORTHOPEDIC SURGERY | Age: 37
End: 2022-02-24
Payer: MEDICAID

## 2022-02-24 VITALS — OXYGEN SATURATION: 98 % | WEIGHT: 293 LBS | HEIGHT: 69 IN | BODY MASS INDEX: 43.4 KG/M2 | HEART RATE: 85 BPM

## 2022-02-24 DIAGNOSIS — M17.0 PRIMARY OSTEOARTHRITIS OF BOTH KNEES: Primary | ICD-10-CM

## 2022-02-24 PROCEDURE — 20611 DRAIN/INJ JOINT/BURSA W/US: CPT | Performed by: ORTHOPAEDIC SURGERY

## 2022-02-24 RX ORDER — HYALURONATE SOD, CROSS-LINKED 30 MG/3 ML
25 SYRINGE (ML) INTRAARTICULAR ONCE
Qty: 3 ML | Refills: 0 | Status: SHIPPED | OUTPATIENT
Start: 2022-02-24 | End: 2022-02-24

## 2022-02-24 NOTE — PROGRESS NOTES
Patient: Karis Izaguirre                MRN: [id number]       SSN: [id number]  YOB: 1985        AGE: 39 y.o. SEX: female  Body mass index is 43.42 kg/m². PCP: Lucero Tripathi DO  02/24/22    Chief Complaint   Patient presents with    Knee Pain     both knees 1st inj       HISTORY:  Karis Izaguirre is a 39 y.o. female who is seen for reevaluation of Bilateral knee and here for 1st injection of synvisc    PROCEDURE:  Under ultrasound guidance, patient's Bilateral knee, after timeout under sterile conditions, was injected with 2 cc of visco-3. Intra-articular. Ultrasound images captured using [employer] [facility] Loop Ultrasound machine using a frequency of 10 MHz with a linear transducer and scanned into patient's chart. [facility]  OFFICE PROCEDURE PROGRESS NOTE        Chart reviewed for the following:   Alice Field MD, have reviewed the History, Physical and updated the Allergic reactions for [address] performed immediately prior to start of procedure:   Alice Field MD, have performed the following reviews on Karis Izaguirre prior to the start of the procedure:            * Patient was identified by name and date of birth   * Agreement on procedure being performed was verified  * Risks and Benefits explained to the patient  * Procedure site verified and marked as necessary  * Patient was positioned for comfort  * Needle placement confirmed by ultrasound  * Consent was signed and verified     Time: 11:19 AM       Date of procedure: 2/24/2022    Procedure performed by:  Theressa Bamberger, MD    Provider assisted by: None     How tolerated by patient: tolerated the procedure well with no complications    Comments: none    IMPRESSION:     ICD-10-CM ICD-9-CM    1.  Primary osteoarthritis of both knees  M17.0 715.16 ARTHROCENTESIS ASPIR&/INJ MAJOR JT/BURSA W/US      sodium hyaluronate (Visco-3) 10 mg/mL syrg injection        PLAN:  Ms. Brie Hawkins will return in one week for her second synvisc injection. Scribed by Nelda Bautista (Lary Spotted) as dictated by MD PO García, Dr. Fritz Rivers, confirm that all documentation is accurate.     rFitz Rivers M.D.   [facility]

## 2022-03-03 ENCOUNTER — OFFICE VISIT (OUTPATIENT)
Dept: ORTHOPEDIC SURGERY | Age: 37
End: 2022-03-03
Payer: MEDICAID

## 2022-03-03 VITALS — OXYGEN SATURATION: 99 % | WEIGHT: 293 LBS | BODY MASS INDEX: 43.4 KG/M2 | HEIGHT: 69 IN | HEART RATE: 87 BPM

## 2022-03-03 DIAGNOSIS — M17.0 PRIMARY OSTEOARTHRITIS OF BOTH KNEES: Primary | ICD-10-CM

## 2022-03-03 PROCEDURE — 20611 DRAIN/INJ JOINT/BURSA W/US: CPT | Performed by: PHYSICIAN ASSISTANT

## 2022-03-03 NOTE — PROGRESS NOTES
Patient: Bert Sinha                MRN: [id number]       SSN: [id number]  YOB: 1985        AGE: 39 y.o. SEX: female  Body mass index is 43.42 kg/m². PCP: Oumou See DO  03/03/22    Chief Complaint   Patient presents with    Knee Pain     both knees       HISTORY:  Bert Sinha is a 39 y.o. female who is seen for reevaluation of Bilateral knee here for 2nd injection of synvisc. PROCEDURE:  Bilateral knee Injection with Ultrasound Guidance    Indication:Bilateral Knee pain/swelling    After sterile prep, 2cc synvisc were injected into the bilateral  Knee. Intra-articular Ultrasound images captured using [address] [employer] [address] Ultrasound machine using a frequency of 10 MHz with a linear transducer and scanned into patient's chart. [facility]  OFFICE PROCEDURE PROGRESS NOTE        Chart reviewed for the following:   Carolina FONTENOT PA-C, have reviewed the History, Physical and updated the Allergic reactions for [address] performed immediately prior to start of procedure:   Carolina FONTENOT PA-C, have performed the following reviews on [facility] prior to the start of the procedure:            * Patient was identified by name and date of birth   * Agreement on procedure being performed was verified  * Risks and Benefits explained to the patient  * Procedure site verified and marked as necessary  * Patient was positioned for comfort  * Consent was signed and verified     Time: 11:35 AM    Date of procedure: 3/3/2022    Procedure performed by:  SHUBHAM Rodney    Provider assisted by: None     How tolerated by patient: tolerated the procedure well with no complications    Comments: none    IMPRESSION:     ICD-10-CM ICD-9-CM    1. Primary osteoarthritis of both knees  M17.0 715.16         PLAN:  Ms. Eusebia Blevins will return in one week for her third synvisc injection.       Carolina Wadsworth PA-C [facility]

## 2022-03-08 ENCOUNTER — TELEPHONE (OUTPATIENT)
Dept: ORTHOPEDIC SURGERY | Age: 37
End: 2022-03-08

## 2022-03-08 NOTE — TELEPHONE ENCOUNTER
PA is required for Sodium Hyaluronate 20MG/2ML    Cover My Meds Key:  [facility], [place]

## 2022-03-09 ENCOUNTER — OFFICE VISIT (OUTPATIENT)
Dept: ORTHOPEDIC SURGERY | Age: 37
End: 2022-03-09
Payer: MEDICAID

## 2022-03-09 VITALS — WEIGHT: 265 LBS | HEIGHT: 69 IN | BODY MASS INDEX: 39.25 KG/M2 | TEMPERATURE: 98.1 F

## 2022-03-09 DIAGNOSIS — M17.0 PRIMARY OSTEOARTHRITIS OF BOTH KNEES: Primary | ICD-10-CM

## 2022-03-09 PROCEDURE — 20611 DRAIN/INJ JOINT/BURSA W/US: CPT | Performed by: ORTHOPAEDIC SURGERY

## 2022-03-09 RX ORDER — HYALURONATE SODIUM 10 MG/ML
25 SYRINGE (ML) INTRAARTICULAR ONCE
Status: DISCONTINUED | OUTPATIENT
Start: 2022-03-09 | End: 2022-03-10

## 2022-03-09 NOTE — PROGRESS NOTES
Patient: Andree Haley                MRN: [id number]       SSN: [id number]  YOB: 1985        AGE: 39 y.o. SEX: female  Body mass index is 39.13 kg/m². PCP: Blake Browne DO  03/09/22    Chief Complaint   Patient presents with    Knee Pain     BILAT       HISTORY:  Andree Haley is a 39 y.o. female who is seen for reevaluation of Bilateral knee and here for 3rd and final injection of synvisc. PROCEDURE:  Under ultrasound guidance, patient's Bilateral knee, after timeout under sterile conditions, was injected with 2 cc of synvisc. Intra-articular. Ultrasound images captured using [employer] Loop Ultrasound machine using a frequency of 10 MHz with a linear transducer and scanned into patient's chart. [facility] - [facility]  OFFICE PROCEDURE PROGRESS NOTE        Chart reviewed for the following:   Franco Noel M.D. , have reviewed the History, Physical and updated the Allergic reactions for [address] [place] [address] Street performed immediately prior to start of procedure:   Franco Noel M.D. , have performed the following reviews on Andree Haley prior to the start of the procedure:            * Patient was identified by name and date of birth   * Agreement on procedure being performed was verified  * Risks and Benefits explained to the patient  * Procedure site verified and marked as necessary  * Patient was positioned for comfort  * Needle placement confirmed by ultrasound  * Consent was signed and verified     Time: 2:19 PM       Date of procedure: 3/9/2022    Procedure performed by:      Provider assisted by: None     How tolerated by patient: tolerated the procedure well with no complications    Comments: none    IMPRESSION:     ICD-10-CM ICD-9-CM    1.  Primary osteoarthritis of both knees  M17.0 715.16 ARTHROCENTESIS ASPIR&/INJ MAJOR JT/BURSA W/US      sodium hyaluronate (SUPARTZ FX/HYALGAN/GENIVSC) 10 mg/mL injection syrg 25 mg        PLAN: Ms. Dale Tamayo has completed her synvisc injection series. she will return as needed. Scribed by Britton Pacheco ([facility]) as dictated by Logan Granger MD    I, Dr. Logan Granger, confirm that all documentation is accurate.     Logan Granger M.D.   [facility]

## 2022-03-19 PROBLEM — E66.01 OBESITY, MORBID (HCC): Status: ACTIVE | Noted: 2019-03-01

## 2024-07-16 ENCOUNTER — OFFICE VISIT (OUTPATIENT)
Age: 39
End: 2024-07-16
Payer: COMMERCIAL

## 2024-07-16 DIAGNOSIS — M25.561 PAIN IN BOTH KNEES, UNSPECIFIED CHRONICITY: Primary | ICD-10-CM

## 2024-07-16 DIAGNOSIS — M17.11 PRIMARY OSTEOARTHRITIS OF RIGHT KNEE: ICD-10-CM

## 2024-07-16 DIAGNOSIS — M25.562 LEFT KNEE PAIN, UNSPECIFIED CHRONICITY: ICD-10-CM

## 2024-07-16 DIAGNOSIS — M17.12 PRIMARY OSTEOARTHRITIS OF LEFT KNEE: ICD-10-CM

## 2024-07-16 DIAGNOSIS — M25.562 PAIN IN BOTH KNEES, UNSPECIFIED CHRONICITY: Primary | ICD-10-CM

## 2024-07-16 PROCEDURE — 20611 DRAIN/INJ JOINT/BURSA W/US: CPT | Performed by: ORTHOPAEDIC SURGERY

## 2024-07-16 PROCEDURE — 73562 X-RAY EXAM OF KNEE 3: CPT | Performed by: ORTHOPAEDIC SURGERY

## 2024-07-16 PROCEDURE — 99214 OFFICE O/P EST MOD 30 MIN: CPT | Performed by: ORTHOPAEDIC SURGERY

## 2024-07-16 RX ORDER — LIDOCAINE HYDROCHLORIDE 10 MG/ML
4 INJECTION, SOLUTION INFILTRATION; PERINEURAL ONCE
Status: COMPLETED | OUTPATIENT
Start: 2024-07-16 | End: 2024-07-16

## 2024-07-16 RX ORDER — TRIAMCINOLONE ACETONIDE 40 MG/ML
40 INJECTION, SUSPENSION INTRA-ARTICULAR; INTRAMUSCULAR ONCE
Status: COMPLETED | OUTPATIENT
Start: 2024-07-16 | End: 2024-07-16

## 2024-07-16 RX ADMIN — TRIAMCINOLONE ACETONIDE 40 MG: 40 INJECTION, SUSPENSION INTRA-ARTICULAR; INTRAMUSCULAR at 13:50

## 2024-07-16 RX ADMIN — TRIAMCINOLONE ACETONIDE 40 MG: 40 INJECTION, SUSPENSION INTRA-ARTICULAR; INTRAMUSCULAR at 13:52

## 2024-07-16 RX ADMIN — LIDOCAINE HYDROCHLORIDE 4 ML: 10 INJECTION, SOLUTION INFILTRATION; PERINEURAL at 13:50

## 2024-07-16 RX ADMIN — LIDOCAINE HYDROCHLORIDE 4 ML: 10 INJECTION, SOLUTION INFILTRATION; PERINEURAL at 13:49

## 2024-07-16 NOTE — PROGRESS NOTES
Rylee Monge  1985   Chief Complaint   Patient presents with    Knee Pain     BILATERAL KNEE PAIN        HISTORY OF PRESENT ILLNESS  Rylee Monge is a 38 y.o. female who presents today for reevaluation of bilateral knee pain. Pain is a 10/10. Pt completed a bilateral synvisc series last ov on 3/9/2022 with relief. Pt denies any recent falls or injuries.  Still having significant amount of problems and limitation in terms of how much she can walk stand.  She had lost a lot of weight    Patient denies any fever, chills, chest pain, shortness of breath or calf pain. The remainder of the review of systems is negative. There are no new illness or injuries other than that mentioned above to report since last seen in the office. No changes in medications, allergies, social or family history.      PHYSICAL EXAM:   There were no vitals taken for this visit.  The patient is a well-developed, well-nourished female   in no acute distress.  The patient is alert and oriented times three.  The patient is alert and oriented times three. Mood and affect are normal.  LYMPHATIC: lymph nodes are not enlarged and are within normal limits  SKIN: normal in color and non tender to palpation. There are no bruises or abrasions noted.   NEUROLOGICAL: Motor sensory exam is within normal limits. Reflexes are equal bilaterally. There is normal sensation to pinprick and light touch  MUSCULOSKELETAL:  Examination Left knee Right knee   Skin Intact Intact   Range of motion     Effusion + +   Medial joint line tenderness + +   Lateral joint line tenderness + +   Tenderness Pes Bursa - -   Tenderness insertion MCL - -   Tenderness insertion LCL - -   Roland’s - -   Patella crepitus + +   Patella grind + +   Lachman - -   Pivot shift - -   Anterior drawer - -   Posterior drawer - -   Varus stress - -   Valgus stress - -   Neurovascular Intact Intact   Calf Swelling and Tenderness to Palpation - -   Charles's Test - -

## 2024-07-22 ENCOUNTER — TELEPHONE (OUTPATIENT)
Age: 39
End: 2024-07-22

## 2024-07-22 NOTE — TELEPHONE ENCOUNTER
Patient called requesting medication to get her to her appointment on 7/30 as she states she is in a lot of pain.

## 2024-07-25 NOTE — TELEPHONE ENCOUNTER
Patient has a history of degenerative arthritis and she had injections of Kenalog few weeks ago.  Unfortunately she is not a candidate for narcotic pain medicine at this time

## 2025-06-02 NOTE — PROGRESS NOTES
bilateral knee Aspiration and Injection with Ultrasound Guidance    Indication: bilateral knee pain/swelling     After sterile prep, bilateral knee was aspirated.130 cc of  fluid were obtained from left knee and 120 cc of fluid  were obtained from the right under ultrasound guidance. Ultrasound images captured and scanned into patient's chart. The fluid was discarded    After sterile prep, 6mL lidocaine with 1mL 40mg Kenalog were injected into the bilateral  knee intraarticular.        [facility] - [facility]  OFFICE PROCEDURE NOTE        Chart reviewed for the following:  Josh VELAZQUEZ MD, have reviewed the History, Physical and updated the Allergic reactions for Rylee Monge     TIME OUT performed immediately prior to start of procedure:  Josh VELAZQUEZ MD, have performed the following reviews on Rylee Monge prior to the start of the procedure:            * Patient was identified by name and date of birth   * Agreement on procedure being performed was verified  * Risks and Benefits explained to the patient  * Procedure site verified and marked as necessary  * Patient was positioned for comfort  * Consent was signed and verified     Time: 3:46 PM     Date of procedure: 6/3/2025    Procedure performed by:  Josh Jimenez MD    Provider assisted by: (see medication administration)    How tolerated by patient: tolerated the procedure well with no complications    Comments: none      IMAGING: XR of the right knee with 3 views obtained in office on 7/16/2024 read and reviewed by : Marked degenerative changes in all 3 compartments worse in the medial and patellofemoral joint     XR of the left knee with 3 views obtained in office on 7/16/2024 read and reviewed by : Marked valgus angulation with severe degenerative changes of all 3 compartments     XR of b/l knees with 3 views from [facility] dated 4/27/2021 was reviewed and read by Dr. Jimenez:

## 2025-06-03 ENCOUNTER — OFFICE VISIT (OUTPATIENT)
Age: 40
End: 2025-06-03
Payer: COMMERCIAL

## 2025-06-03 VITALS — BODY MASS INDEX: 28.61 KG/M2 | HEIGHT: 69 IN | WEIGHT: 193.2 LBS

## 2025-06-03 DIAGNOSIS — M17.12 PRIMARY OSTEOARTHRITIS OF LEFT KNEE: Primary | ICD-10-CM

## 2025-06-03 DIAGNOSIS — M25.561 PAIN IN BOTH KNEES, UNSPECIFIED CHRONICITY: ICD-10-CM

## 2025-06-03 DIAGNOSIS — M25.562 PAIN IN BOTH KNEES, UNSPECIFIED CHRONICITY: ICD-10-CM

## 2025-06-03 DIAGNOSIS — M17.11 PRIMARY OSTEOARTHRITIS OF RIGHT KNEE: ICD-10-CM

## 2025-06-03 PROCEDURE — 20611 DRAIN/INJ JOINT/BURSA W/US: CPT | Performed by: ORTHOPAEDIC SURGERY

## 2025-06-03 PROCEDURE — 99214 OFFICE O/P EST MOD 30 MIN: CPT | Performed by: ORTHOPAEDIC SURGERY

## 2025-06-03 RX ORDER — TRIAMCINOLONE ACETONIDE 40 MG/ML
80 INJECTION, SUSPENSION INTRA-ARTICULAR; INTRAMUSCULAR ONCE
Status: COMPLETED | OUTPATIENT
Start: 2025-06-03 | End: 2025-06-03

## 2025-06-03 RX ORDER — TRIAMCINOLONE ACETONIDE 40 MG/ML
40 INJECTION, SUSPENSION INTRA-ARTICULAR; INTRAMUSCULAR ONCE
Status: SHIPPED | OUTPATIENT
Start: 2025-06-03

## 2025-06-03 RX ADMIN — TRIAMCINOLONE ACETONIDE 80 MG: 40 INJECTION, SUSPENSION INTRA-ARTICULAR; INTRAMUSCULAR at 15:04

## 2025-07-29 ENCOUNTER — TELEPHONE (OUTPATIENT)
Age: 40
End: 2025-07-29

## 2025-07-29 DIAGNOSIS — M17.12 PRIMARY OSTEOARTHRITIS OF LEFT KNEE: Primary | ICD-10-CM

## 2025-07-29 DIAGNOSIS — M17.11 PRIMARY OSTEOARTHRITIS OF RIGHT KNEE: ICD-10-CM

## 2025-07-29 NOTE — TELEPHONE ENCOUNTER
----- Message from Deborah THOMSON sent at 7/28/2025  4:12 PM EDT -----  Regarding: Specialist Appointment Request - Established  Specialist Appointment Request - Established    What Specialty? Select Speciality: Orthopedics     Type of Appointment: Appointment Type: Injection    Reason for appointment?  [Enter diagnosis or symptom: bilateral knees ( mainly left knee) would like it drained     Scheduling Preference per Patient: Date, Time, Provider: any      Additional Information:   patient inquiring about gel injection authorization and wants a sooner appt if possible.     --------------------------------------------------------------------------------------------------------------------------    Relationship to Patient: Self  Call Back Information: OK to leave message on voicemail  Preferred Call Back Number: Phone  [phone]

## 2025-08-12 ENCOUNTER — OFFICE VISIT (OUTPATIENT)
Age: 40
End: 2025-08-12

## 2025-08-12 VITALS — BODY MASS INDEX: 28.53 KG/M2 | HEIGHT: 69 IN

## 2025-08-12 DIAGNOSIS — M17.12 PRIMARY OSTEOARTHRITIS OF LEFT KNEE: Primary | ICD-10-CM

## 2025-08-12 DIAGNOSIS — M17.11 PRIMARY OSTEOARTHRITIS OF RIGHT KNEE: ICD-10-CM

## 2025-08-26 ENCOUNTER — OFFICE VISIT (OUTPATIENT)
Age: 40
End: 2025-08-26

## 2025-08-26 VITALS — WEIGHT: 226.8 LBS | HEIGHT: 69 IN | BODY MASS INDEX: 33.59 KG/M2

## 2025-08-26 DIAGNOSIS — M17.11 PRIMARY OSTEOARTHRITIS OF RIGHT KNEE: ICD-10-CM

## 2025-08-26 DIAGNOSIS — M17.12 PRIMARY OSTEOARTHRITIS OF LEFT KNEE: Primary | ICD-10-CM

## 2025-09-02 ENCOUNTER — OFFICE VISIT (OUTPATIENT)
Age: 40
End: 2025-09-02

## 2025-09-02 VITALS — BODY MASS INDEX: 33.47 KG/M2 | WEIGHT: 226 LBS | HEIGHT: 69 IN

## 2025-09-02 DIAGNOSIS — M17.12 PRIMARY OSTEOARTHRITIS OF LEFT KNEE: Primary | ICD-10-CM

## 2025-09-02 DIAGNOSIS — M17.11 PRIMARY OSTEOARTHRITIS OF RIGHT KNEE: ICD-10-CM
